# Patient Record
Sex: FEMALE | Race: OTHER | Employment: FULL TIME | URBAN - METROPOLITAN AREA
[De-identification: names, ages, dates, MRNs, and addresses within clinical notes are randomized per-mention and may not be internally consistent; named-entity substitution may affect disease eponyms.]

---

## 2017-11-22 ENCOUNTER — HOSPITAL ENCOUNTER (OUTPATIENT)
Dept: LAB | Age: 32
Discharge: HOME | End: 2017-11-22

## 2017-11-22 PROCEDURE — 84702 CHORIONIC GONADOTROPIN TEST: CPT | Performed by: NURSE PRACTITIONER

## 2017-11-22 PROCEDURE — 84443 ASSAY THYROID STIM HORMONE: CPT | Performed by: NURSE PRACTITIONER

## 2017-11-22 PROCEDURE — 80053 COMPREHEN METABOLIC PANEL: CPT | Performed by: NURSE PRACTITIONER

## 2017-11-23 LAB
ALBUMIN SERPL-MCNC: 4 G/DL (ref 3.5–5)
ALBUMIN/GLOB SERPL: 1.1 {RATIO} (ref 1.1–2.2)
ALP SERPL-CCNC: 79 U/L (ref 45–117)
ALT SERPL-CCNC: 21 U/L (ref 12–78)
ANION GAP SERPL CALC-SCNC: 7 MMOL/L (ref 5–15)
AST SERPL-CCNC: 8 U/L (ref 15–37)
BILIRUB SERPL-MCNC: 0.3 MG/DL (ref 0.2–1)
BUN SERPL-MCNC: 11 MG/DL (ref 6–20)
BUN/CREAT SERPL: 15 (ref 12–20)
CALCIUM SERPL-MCNC: 9.2 MG/DL (ref 8.5–10.1)
CHLORIDE SERPL-SCNC: 105 MMOL/L (ref 97–108)
CO2 SERPL-SCNC: 27 MMOL/L (ref 21–32)
CREAT SERPL-MCNC: 0.72 MG/DL (ref 0.55–1.02)
GLOBULIN SER CALC-MCNC: 3.8 G/DL (ref 2–4)
GLUCOSE SERPL-MCNC: 81 MG/DL (ref 65–100)
HCG SERPL-ACNC: <1 MIU/ML (ref 0–6)
POTASSIUM SERPL-SCNC: 4.1 MMOL/L (ref 3.5–5.1)
PROT SERPL-MCNC: 7.8 G/DL (ref 6.4–8.2)
SODIUM SERPL-SCNC: 139 MMOL/L (ref 136–145)
TSH SERPL DL<=0.05 MIU/L-ACNC: 1.09 UIU/ML (ref 0.36–3.74)

## 2018-08-29 ENCOUNTER — HOSPITAL ENCOUNTER (OUTPATIENT)
Dept: LAB | Age: 33
Discharge: HOME | End: 2018-08-29

## 2018-08-29 PROCEDURE — 87591 N.GONORRHOEAE DNA AMP PROB: CPT | Performed by: NURSE PRACTITIONER

## 2018-08-29 PROCEDURE — 87491 CHLMYD TRACH DNA AMP PROBE: CPT | Performed by: NURSE PRACTITIONER

## 2018-08-31 LAB
C TRACH DNA SPEC QL NAA+PROBE: NEGATIVE
N GONORRHOEA DNA SPEC QL NAA+PROBE: NEGATIVE
SAMPLE TYPE: NORMAL
SERVICE CMNT-IMP: NORMAL
SPECIMEN SOURCE: NORMAL

## 2018-09-04 ENCOUNTER — HOSPITAL ENCOUNTER (OUTPATIENT)
Dept: ULTRASOUND IMAGING | Age: 33
Discharge: HOME | End: 2018-09-04
Attending: INTERNAL MEDICINE
Payer: SUBSIDIZED

## 2018-09-04 DIAGNOSIS — Z32.00 ENCOUNTER FOR PREGNANCY TEST: ICD-10-CM

## 2018-09-04 PROCEDURE — 76856 US EXAM PELVIC COMPLETE: CPT

## 2018-09-04 PROCEDURE — 76830 TRANSVAGINAL US NON-OB: CPT

## 2019-09-16 ENCOUNTER — APPOINTMENT (OUTPATIENT)
Dept: CT IMAGING | Age: 34
End: 2019-09-16
Attending: PHYSICIAN ASSISTANT
Payer: SELF-PAY

## 2019-09-16 ENCOUNTER — HOSPITAL ENCOUNTER (EMERGENCY)
Age: 34
Discharge: HOME | End: 2019-09-16
Attending: EMERGENCY MEDICINE | Admitting: EMERGENCY MEDICINE
Payer: SELF-PAY

## 2019-09-16 VITALS
RESPIRATION RATE: 18 BRPM | TEMPERATURE: 98.2 F | SYSTOLIC BLOOD PRESSURE: 121 MMHG | HEIGHT: 65 IN | HEART RATE: 74 BPM | DIASTOLIC BLOOD PRESSURE: 75 MMHG | OXYGEN SATURATION: 100 %

## 2019-09-16 DIAGNOSIS — N39.0 URINARY TRACT INFECTION WITH HEMATURIA, SITE UNSPECIFIED: ICD-10-CM

## 2019-09-16 DIAGNOSIS — N30.01 ACUTE HEMORRHAGIC CYSTITIS: Primary | ICD-10-CM

## 2019-09-16 DIAGNOSIS — R31.9 URINARY TRACT INFECTION WITH HEMATURIA, SITE UNSPECIFIED: ICD-10-CM

## 2019-09-16 LAB
ALBUMIN SERPL-MCNC: 3.9 G/DL (ref 3.5–5)
ALBUMIN/GLOB SERPL: 0.9 {RATIO} (ref 1.1–2.2)
ALP SERPL-CCNC: 90 U/L (ref 45–117)
ALT SERPL-CCNC: 32 U/L (ref 12–78)
ANION GAP SERPL CALC-SCNC: 9 MMOL/L (ref 5–15)
APPEARANCE UR: ABNORMAL
AST SERPL-CCNC: 16 U/L (ref 15–37)
BACTERIA URNS QL MICRO: ABNORMAL /HPF
BASOPHILS # BLD: 0 K/UL (ref 0–0.1)
BASOPHILS NFR BLD: 0 % (ref 0–1)
BILIRUB SERPL-MCNC: 0.3 MG/DL (ref 0.2–1)
BILIRUB UR QL: NEGATIVE
BUN SERPL-MCNC: 14 MG/DL (ref 6–20)
BUN/CREAT SERPL: 15 (ref 12–20)
CALCIUM SERPL-MCNC: 9.4 MG/DL (ref 8.5–10.1)
CHLORIDE SERPL-SCNC: 108 MMOL/L (ref 97–108)
CO2 SERPL-SCNC: 25 MMOL/L (ref 21–32)
COLOR UR: ABNORMAL
CREAT SERPL-MCNC: 0.91 MG/DL (ref 0.55–1.02)
DIFFERENTIAL METHOD BLD: NORMAL
EOSINOPHIL # BLD: 0.1 K/UL (ref 0–0.4)
EOSINOPHIL NFR BLD: 1 % (ref 0–7)
EPITH CASTS URNS QL MICRO: ABNORMAL /LPF
ERYTHROCYTE [DISTWIDTH] IN BLOOD BY AUTOMATED COUNT: 13.9 % (ref 11.5–14.5)
GLOBULIN SER CALC-MCNC: 4.2 G/DL (ref 2–4)
GLUCOSE SERPL-MCNC: 106 MG/DL (ref 65–100)
GLUCOSE UR STRIP.AUTO-MCNC: NEGATIVE MG/DL
HCG UR QL: NEGATIVE
HCT VFR BLD AUTO: 40.9 % (ref 35–47)
HGB BLD-MCNC: 13.2 G/DL (ref 11.5–16)
HGB UR QL STRIP: ABNORMAL
IMM GRANULOCYTES # BLD AUTO: 0 K/UL (ref 0–0.04)
IMM GRANULOCYTES NFR BLD AUTO: 0 % (ref 0–0.5)
KETONES UR QL STRIP.AUTO: NEGATIVE MG/DL
LEUKOCYTE ESTERASE UR QL STRIP.AUTO: NEGATIVE
LIPASE SERPL-CCNC: 130 U/L (ref 73–393)
LYMPHOCYTES # BLD: 2.4 K/UL (ref 0.8–3.5)
LYMPHOCYTES NFR BLD: 29 % (ref 12–49)
MCH RBC QN AUTO: 29.7 PG (ref 26–34)
MCHC RBC AUTO-ENTMCNC: 32.3 G/DL (ref 30–36.5)
MCV RBC AUTO: 92.1 FL (ref 80–99)
MONOCYTES # BLD: 0.5 K/UL (ref 0–1)
MONOCYTES NFR BLD: 6 % (ref 5–13)
NEUTS SEG # BLD: 5.3 K/UL (ref 1.8–8)
NEUTS SEG NFR BLD: 64 % (ref 32–75)
NITRITE UR QL STRIP.AUTO: POSITIVE
NRBC # BLD: 0 K/UL (ref 0–0.01)
NRBC BLD-RTO: 0 PER 100 WBC
PH UR STRIP: 5 [PH] (ref 5–8)
PLATELET # BLD AUTO: 251 K/UL (ref 150–400)
PMV BLD AUTO: 10.6 FL (ref 8.9–12.9)
POTASSIUM SERPL-SCNC: 3.6 MMOL/L (ref 3.5–5.1)
PROT SERPL-MCNC: 8.1 G/DL (ref 6.4–8.2)
PROT UR STRIP-MCNC: 100 MG/DL
RBC # BLD AUTO: 4.44 M/UL (ref 3.8–5.2)
RBC #/AREA URNS HPF: >100 /HPF (ref 0–5)
SODIUM SERPL-SCNC: 142 MMOL/L (ref 136–145)
SP GR UR REFRACTOMETRY: 1.02 (ref 1–1.03)
UROBILINOGEN UR QL STRIP.AUTO: 0.2 EU/DL (ref 0.2–1)
WBC # BLD AUTO: 8.3 K/UL (ref 3.6–11)
WBC URNS QL MICRO: ABNORMAL /HPF (ref 0–4)

## 2019-09-16 PROCEDURE — 96374 THER/PROPH/DIAG INJ IV PUSH: CPT

## 2019-09-16 PROCEDURE — 80053 COMPREHEN METABOLIC PANEL: CPT

## 2019-09-16 PROCEDURE — 96375 TX/PRO/DX INJ NEW DRUG ADDON: CPT

## 2019-09-16 PROCEDURE — 36415 COLL VENOUS BLD VENIPUNCTURE: CPT

## 2019-09-16 PROCEDURE — 99283 EMERGENCY DEPT VISIT LOW MDM: CPT

## 2019-09-16 PROCEDURE — 83690 ASSAY OF LIPASE: CPT

## 2019-09-16 PROCEDURE — 74011250636 HC RX REV CODE- 250/636: Performed by: PHYSICIAN ASSISTANT

## 2019-09-16 PROCEDURE — 81025 URINE PREGNANCY TEST: CPT

## 2019-09-16 PROCEDURE — 74176 CT ABD & PELVIS W/O CONTRAST: CPT

## 2019-09-16 PROCEDURE — 81001 URINALYSIS AUTO W/SCOPE: CPT

## 2019-09-16 PROCEDURE — 85025 COMPLETE CBC W/AUTO DIFF WBC: CPT

## 2019-09-16 RX ORDER — ONDANSETRON 2 MG/ML
4 INJECTION INTRAMUSCULAR; INTRAVENOUS
Status: COMPLETED | OUTPATIENT
Start: 2019-09-16 | End: 2019-09-16

## 2019-09-16 RX ORDER — PHENAZOPYRIDINE HYDROCHLORIDE 200 MG/1
200 TABLET, FILM COATED ORAL 3 TIMES DAILY
Qty: 6 TAB | Refills: 0 | Status: SHIPPED | OUTPATIENT
Start: 2019-09-16 | End: 2019-09-18

## 2019-09-16 RX ORDER — CEFDINIR 300 MG/1
300 CAPSULE ORAL 2 TIMES DAILY
Qty: 20 CAP | Refills: 0 | Status: SHIPPED | OUTPATIENT
Start: 2019-09-16 | End: 2021-05-26 | Stop reason: SDUPTHER

## 2019-09-16 RX ORDER — KETOROLAC TROMETHAMINE 30 MG/ML
30 INJECTION, SOLUTION INTRAMUSCULAR; INTRAVENOUS
Status: COMPLETED | OUTPATIENT
Start: 2019-09-16 | End: 2019-09-16

## 2019-09-16 RX ADMIN — ONDANSETRON 4 MG: 2 INJECTION INTRAMUSCULAR; INTRAVENOUS at 15:01

## 2019-09-16 RX ADMIN — KETOROLAC TROMETHAMINE 30 MG: 30 INJECTION, SOLUTION INTRAMUSCULAR at 15:01

## 2019-09-16 NOTE — ED NOTES
Discharge instructions given to patient, all questions answered and patient verbalized understanding.   Patient ambulatory to ED lobby

## 2019-09-16 NOTE — ED PROVIDER NOTES
This is 28 yo  female presenting ambulatory to the emergency department with complaint of a 3-day history of left-sided flank pain with associated nausea, and dark-colored urine. She describes pain is constant but significantly worse today. She has taken Tylenol for pain yesterday. No medications for pain today. She reports mild constipation. No associated documented fever, chills, headache, chest pain, shortness of breath. She reports associated urinary frequency           No past medical history on file. No past surgical history on file. No family history on file.     Social History     Socioeconomic History    Marital status:      Spouse name: Not on file    Number of children: Not on file    Years of education: Not on file    Highest education level: Not on file   Occupational History    Not on file   Social Needs    Financial resource strain: Not on file    Food insecurity:     Worry: Not on file     Inability: Not on file    Transportation needs:     Medical: Not on file     Non-medical: Not on file   Tobacco Use    Smoking status: Not on file   Substance and Sexual Activity    Alcohol use: Not on file    Drug use: Not on file    Sexual activity: Not on file   Lifestyle    Physical activity:     Days per week: Not on file     Minutes per session: Not on file    Stress: Not on file   Relationships    Social connections:     Talks on phone: Not on file     Gets together: Not on file     Attends [employer]ism service: Not on file     Active member of club or organization: Not on file     Attends meetings of clubs or organizations: Not on file     Relationship status: Not on file    Intimate partner violence:     Fear of current or ex partner: Not on file     Emotionally abused: Not on file     Physically abused: Not on file     Forced sexual activity: Not on file   Other Topics Concern    Not on file   Social History Narrative    Not on file         ALLERGIES: Patient has no allergy information on record. Review of Systems   Constitutional: Negative. Negative for chills, fatigue and fever. HENT: Negative. Negative for congestion, ear pain, facial swelling, rhinorrhea, sneezing and sore throat. Respiratory: Negative for cough and shortness of breath. Cardiovascular: Negative. Negative for chest pain. Gastrointestinal: Positive for abdominal pain (left flank) and nausea. Negative for constipation, diarrhea and vomiting. Genitourinary: Positive for frequency. Negative for difficulty urinating and urgency. Neurological: Negative for dizziness, numbness and headaches. All other systems reviewed and are negative. Visit Vitals  /88 (BP 1 Location: Right arm, BP Patient Position: At rest)   Pulse (!) 112   Temp 98.7 °F (37.1 °C)   Resp 15   Ht 5' 5\" (1.651 m)   SpO2 98%            Physical Exam   Constitutional: She is oriented to person, place, and time. She appears well-developed and well-nourished. No distress. Well appearing  female in NAD   HENT:   Head: Normocephalic and atraumatic. Left Ear: External ear normal.   Mouth/Throat: No oropharyngeal exudate. Eyes: Pupils are equal, round, and reactive to light. Conjunctivae are normal.   Neck: Normal range of motion. Neck supple. Cardiovascular: Normal rate, regular rhythm and normal heart sounds. Pulmonary/Chest: Effort normal. No respiratory distress. She has no wheezes. Abdominal: Soft. Bowel sounds are normal. She exhibits no distension. There is no tenderness. There is no rebound. No CVA tenderness   Musculoskeletal: Normal range of motion. Neurological: She is alert and oriented to person, place, and time. Skin: Skin is warm and dry. No ecchymosis, no laceration and no lesion noted. Nursing note and vitals reviewed.        MDM  Number of Diagnoses or Management Options  Diagnosis management comments: 26 yo  female with complaint of left flank pain with associated nausea and dark colored urine for past three days. DDX includes obstructive uropathy vs UTI vs pyelo vs diverticulitis vs obstipation amongst others  Plan  CBC  CMP  Lipase  UA  Ct abd/pel  Analgesia  zofran  Reassess. Ainsley Lazo         Amount and/or Complexity of Data Reviewed  Clinical lab tests: ordered and reviewed  Tests in the radiology section of CPT®: reviewed and ordered  Independent visualization of images, tracings, or specimens: yes           Procedures          Progress note    Labs reviewed. Awaiting CT abd/pel. Ainsley Lazo    Pt care transferred to [facility] at end of shift. Ainsley Lazo       I was personally available for consultation in the emergency department. I have reviewed the chart and agree with the documentation recorded by the [facility], including the assessment, treatment plan, and disposition.   Sade Contreras MD

## 2019-09-16 NOTE — DISCHARGE INSTRUCTIONS
Patient Education        Wenda Marline en las mujeres: Instrucciones de cuidado - [ Urinary Tract Infection in Women: Care Instructions ]  Instrucciones de cuidado    Mike infección urinaria (UTI, por dori siglas en inglés) es un término general que hace referencia a mike infección que se produce en cualquier parte entre los riñones y la uretra (conducto por el cual se expulsa la orina). La mayoría de las UTI son infecciones de la vejiga. Con frecuencia, causan dolor o ardor al Tish Carton. Las UTI son causadas por bacterias y pueden curarse con antibióticos. Asegúrese de completar el tratamiento para que la infección desaparezca. La atención de seguimiento es mike parte clave de fang tratamiento y seguridad. Asegúrese de hacer y acudir a todas las citas, y llame a fang médico si está teniendo problemas. También es mike buena idea saber los resultados de dori exámenes y mantener mike lista de los medicamentos que tressa. ¿Cómo puede cuidarse en el hogar? · [address] No deje de tomarlos por el hecho de sentirse mejor. Debe paulino todos los antibióticos hasta terminarlos. · Jodie los próximos margaret o [address], mikki mayor cantidad de [place] y otros líquidos. [place] puede ayudar a eliminar las bacterias que provocan la infección. (Si tiene mike enfermedad de los riñones, el corazón o el hígado y tiene que Mendoza's líquidos, hable con fang médico antes de aumentar fang consumo). · Evite las bebidas gaseosas o con cafeína. Pueden irritar la vejiga. · Orine con frecuencia. Trate de vaciar la vejiga cada vez que orine. · Para aliviar el dolor, tome un baño caliente o colóquese mike almohadilla térmica a baja temperatura sobre la parte baja del abdomen o la kwame genital. Nunca se duerma mientras [place] mike almohadilla térmica. Para prevenir las infecciones urinarias  · Mikki abundante agua todos los días. [place] la ayuda a orinar con frecuencia, lo que elimina las bacterias de fang organismo.  (Si tiene [employer] enfermedad de los riñones, el corazón o el hígado y tiene que Mendoza's líquidos, hable con fang médico antes de aumentar fang consumo). · Orine cuando necesite hacerlo. · Orine inmediatamente después de oriana tenido [employer]. · Cámbiese las toallas sanitarias con frecuencia. · Evite el uso de lavados vaginales, los domingo de burbujas, los [place]ter[place] de higiene femenina y otros productos para la higiene femenina que contengan desodorantes. · Después de ir al baño, límpiese de adelante hacia atrás. ¿Cuándo debes pedir ayuda? Llama a tu médico ahora mismo o busca atención médica inmediata si:    · Aparecen síntomas kemi fiebre, escalofríos, náuseas o vómitos por primera vez, o empeoran.     · Te empieza a doler la espalda, clay debajo de la caja torácica. A esto se le llama dolor en el flanco.     · Aparece yousuf o pus en la orina.     · Tienes problemas con los antibióticos.    Presta especial atención a los cambios en tu mindy y asegúrate de comunicarte con tu médico si:    · No mejoras después de oriana tomado un antibiótico sherly 2 días.     · Los síntomas desaparecen y Trula Porsche. ¿Dónde puede encontrar más información en inglés? Yasmeen Loomis a http://may-gillian.info/. Maryfrances Carrel Y557 en la búsqueda para aprender más acerca de \"Infección urinaria en las mujeres: Instrucciones de cuidado - [ Urinary Tract Infection in Women: Care Instructions ]. \"  Revisado: 19 pattie, 2018  Versión del contenido: 12.1  © 4320-6646 [employer]wise, Incorporated. Las instrucciones de cuidado fueron adaptadas bajo licencia por [employer] (which disclaims liability or warranty for this information). Si usted tiene [place] afección médica o sobre estas instrucciones, siempre pregunte a fang profesional de mindy. [facility] niega toda garantía o responsabilidad por fang uso de esta información.

## 2019-09-27 ENCOUNTER — HOSPITAL ENCOUNTER (OUTPATIENT)
Dept: LAB | Age: 34
Discharge: HOME | End: 2019-09-27

## 2019-09-27 PROCEDURE — 87186 SC STD MICRODIL/AGAR DIL: CPT

## 2019-09-27 PROCEDURE — 87086 URINE CULTURE/COLONY COUNT: CPT

## 2019-09-27 PROCEDURE — 87077 CULTURE AEROBIC IDENTIFY: CPT

## 2019-09-29 LAB
BACTERIA SPEC CULT: ABNORMAL
CC UR VC: ABNORMAL
SERVICE CMNT-IMP: ABNORMAL

## 2020-02-25 ENCOUNTER — HOSPITAL ENCOUNTER (OUTPATIENT)
Dept: LAB | Age: 35
Discharge: HOME | End: 2020-02-25

## 2020-02-25 PROCEDURE — 80061 LIPID PANEL: CPT

## 2020-02-25 PROCEDURE — 80048 BASIC METABOLIC PNL TOTAL CA: CPT

## 2020-02-25 PROCEDURE — 85025 COMPLETE CBC W/AUTO DIFF WBC: CPT

## 2020-02-26 LAB
ANION GAP SERPL CALC-SCNC: 8 MMOL/L (ref 5–15)
BASOPHILS # BLD: 0 K/UL (ref 0–0.1)
BASOPHILS NFR BLD: 0 % (ref 0–1)
BUN SERPL-MCNC: 11 MG/DL (ref 6–20)
BUN/CREAT SERPL: 17 (ref 12–20)
CALCIUM SERPL-MCNC: 9.2 MG/DL (ref 8.5–10.1)
CHLORIDE SERPL-SCNC: 107 MMOL/L (ref 97–108)
CHOLEST SERPL-MCNC: 256 MG/DL
CO2 SERPL-SCNC: 24 MMOL/L (ref 21–32)
CREAT SERPL-MCNC: 0.66 MG/DL (ref 0.55–1.02)
DIFFERENTIAL METHOD BLD: NORMAL
EOSINOPHIL # BLD: 0.1 K/UL (ref 0–0.4)
EOSINOPHIL NFR BLD: 1 % (ref 0–7)
ERYTHROCYTE [DISTWIDTH] IN BLOOD BY AUTOMATED COUNT: 14 % (ref 11.5–14.5)
GLUCOSE SERPL-MCNC: 71 MG/DL (ref 65–100)
HCT VFR BLD AUTO: 43.6 % (ref 35–47)
HDLC SERPL-MCNC: 50 MG/DL
HDLC SERPL: 5.1 {RATIO} (ref 0–5)
HGB BLD-MCNC: 13.6 G/DL (ref 11.5–16)
IMM GRANULOCYTES # BLD AUTO: 0 K/UL (ref 0–0.04)
IMM GRANULOCYTES NFR BLD AUTO: 0 % (ref 0–0.5)
LDLC SERPL CALC-MCNC: 165.8 MG/DL (ref 0–100)
LIPID PROFILE,FLP: ABNORMAL
LYMPHOCYTES # BLD: 2.2 K/UL (ref 0.8–3.5)
LYMPHOCYTES NFR BLD: 33 % (ref 12–49)
MCH RBC QN AUTO: 29.9 PG (ref 26–34)
MCHC RBC AUTO-ENTMCNC: 31.2 G/DL (ref 30–36.5)
MCV RBC AUTO: 95.8 FL (ref 80–99)
MONOCYTES # BLD: 0.4 K/UL (ref 0–1)
MONOCYTES NFR BLD: 5 % (ref 5–13)
NEUTS SEG # BLD: 4 K/UL (ref 1.8–8)
NEUTS SEG NFR BLD: 61 % (ref 32–75)
NRBC # BLD: 0 K/UL (ref 0–0.01)
NRBC BLD-RTO: 0 PER 100 WBC
PLATELET # BLD AUTO: 224 K/UL (ref 150–400)
PMV BLD AUTO: 10.9 FL (ref 8.9–12.9)
POTASSIUM SERPL-SCNC: 4.2 MMOL/L (ref 3.5–5.1)
RBC # BLD AUTO: 4.55 M/UL (ref 3.8–5.2)
SODIUM SERPL-SCNC: 139 MMOL/L (ref 136–145)
TRIGL SERPL-MCNC: 201 MG/DL (ref ?–150)
VLDLC SERPL CALC-MCNC: 40.2 MG/DL
WBC # BLD AUTO: 6.6 K/UL (ref 3.6–11)

## 2020-03-02 ENCOUNTER — HOSPITAL ENCOUNTER (EMERGENCY)
Age: 35
Discharge: LEFT BEFORE DISCHARGE | End: 2020-03-02
Attending: STUDENT IN AN ORGANIZED HEALTH CARE EDUCATION/TRAINING PROGRAM | Admitting: STUDENT IN AN ORGANIZED HEALTH CARE EDUCATION/TRAINING PROGRAM
Payer: SELF-PAY

## 2020-03-02 VITALS
TEMPERATURE: 98.4 F | RESPIRATION RATE: 18 BRPM | OXYGEN SATURATION: 98 % | HEART RATE: 88 BPM | DIASTOLIC BLOOD PRESSURE: 93 MMHG | SYSTOLIC BLOOD PRESSURE: 144 MMHG

## 2020-03-02 PROCEDURE — 75810000275 HC EMERGENCY DEPT VISIT NO LEVEL OF CARE

## 2020-03-02 NOTE — ED TRIAGE NOTES
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  Patient is unsure when she last got a tetanus shot

## 2021-03-25 PROCEDURE — 36415 COLL VENOUS BLD VENIPUNCTURE: CPT

## 2021-03-25 PROCEDURE — 83036 HEMOGLOBIN GLYCOSYLATED A1C: CPT

## 2021-03-25 PROCEDURE — 80061 LIPID PANEL: CPT

## 2021-03-26 ENCOUNTER — HOSPITAL ENCOUNTER (OUTPATIENT)
Dept: LAB | Age: 36
Discharge: HOME | End: 2021-03-26

## 2021-03-26 LAB
CHOLEST SERPL-MCNC: 223 MG/DL
EST. AVERAGE GLUCOSE BLD GHB EST-MCNC: 105 MG/DL
HBA1C MFR BLD: 5.3 % (ref 4–5.6)
HDLC SERPL-MCNC: 48 MG/DL
HDLC SERPL: 4.6 {RATIO} (ref 0–5)
LDLC SERPL CALC-MCNC: 139.6 MG/DL (ref 0–100)
LIPID PROFILE,FLP: ABNORMAL
TRIGL SERPL-MCNC: 177 MG/DL (ref ?–150)
VLDLC SERPL CALC-MCNC: 35.4 MG/DL

## 2021-04-19 ENCOUNTER — HOSPITAL ENCOUNTER (EMERGENCY)
Dept: ULTRASOUND IMAGING | Age: 36
Discharge: HOME | End: 2021-04-19
Attending: NURSE PRACTITIONER

## 2021-04-19 ENCOUNTER — HOSPITAL ENCOUNTER (EMERGENCY)
Age: 36
Discharge: HOME | End: 2021-04-20
Attending: EMERGENCY MEDICINE

## 2021-04-19 DIAGNOSIS — Z34.90 PREGNANCY AT EARLY STAGE: Primary | ICD-10-CM

## 2021-04-19 LAB
APPEARANCE UR: NORMAL
BILIRUB UR QL: NEGATIVE
COLOR UR: YELLOW
GLUCOSE UR STRIP.AUTO-MCNC: NEGATIVE MG/DL
HCG SERPL-ACNC: 843 MIU/ML (ref 0–10)
HCG UR QL: POSITIVE
HGB UR QL STRIP: NEGATIVE
KETONES UR QL STRIP.AUTO: NEGATIVE MG/DL
LEUKOCYTE ESTERASE UR QL STRIP.AUTO: NEGATIVE
NITRITE UR QL STRIP.AUTO: NEGATIVE
PH UR STRIP: 5.5 [PH] (ref 5–8)
PROT UR STRIP-MCNC: NEGATIVE MG/DL
SERVICE CMNT-IMP: NORMAL
SP GR UR REFRACTOMETRY: 1.02 (ref 1–1.03)
UROBILINOGEN UR QL STRIP.AUTO: 0.2 EU/DL (ref 0.2–1)
WET PREP GENITAL: NORMAL

## 2021-04-19 PROCEDURE — 81003 URINALYSIS AUTO W/O SCOPE: CPT

## 2021-04-19 PROCEDURE — 99284 EMERGENCY DEPT VISIT MOD MDM: CPT

## 2021-04-19 PROCEDURE — 84702 CHORIONIC GONADOTROPIN TEST: CPT

## 2021-04-19 PROCEDURE — 87210 SMEAR WET MOUNT SALINE/INK: CPT

## 2021-04-19 PROCEDURE — 87491 CHLMYD TRACH DNA AMP PROBE: CPT

## 2021-04-19 PROCEDURE — 87086 URINE CULTURE/COLONY COUNT: CPT

## 2021-04-19 PROCEDURE — 81025 URINE PREGNANCY TEST: CPT

## 2021-04-19 PROCEDURE — 93975 VASCULAR STUDY: CPT

## 2021-04-19 RX ORDER — B-COMPLEX WITH VITAMIN C
1 TABLET ORAL DAILY
Qty: 30 TAB | Refills: 1 | Status: SHIPPED | OUTPATIENT
Start: 2021-04-19 | End: 2021-05-26 | Stop reason: ALTCHOICE

## 2021-04-20 VITALS
HEIGHT: 63 IN | TEMPERATURE: 98.4 F | BODY MASS INDEX: 35.44 KG/M2 | HEART RATE: 94 BPM | OXYGEN SATURATION: 100 % | DIASTOLIC BLOOD PRESSURE: 61 MMHG | WEIGHT: 200 LBS | RESPIRATION RATE: 20 BRPM | SYSTOLIC BLOOD PRESSURE: 119 MMHG

## 2021-04-20 NOTE — ED PROVIDER NOTES
EMERGENCY DEPARTMENT HISTORY AND PHYSICAL EXAM    8:38 PM      Date: 4/19/2021  Patient Name: Arlin Dias    History of Presenting Illness     Chief Complaint   Patient presents with    Abdominal Pain         History Provided By: Patient    Additional History (Context): Arlin Dias is a 38 y.o. female with no significant past medical history who presents with pelvic pain for 3 weeks and positive pregnancy test.  Patient took a positive pregnancy test 5 days ago at home. Last normal menstrual cycle was February 10, 2021. She denies any vaginal bleeding or vaginal discharge. She denies any dysuria, nausea, vomiting, diarrhea, constipation, fever, or chills. G5, P3, A1. PCP: None    Current Outpatient Medications   Medication Sig Dispense Refill    prenatal vit no.130-iron-folic (Prenatal Vitamin) 27 mg iron- 800 mcg tab tablet Take 1 Tab by mouth daily. 30 Tab 1    cefdinir (OMNICEF) 300 mg capsule Take 1 Cap by mouth two (2) times a day. 20 Cap 0       Past History     Past Medical History:  History reviewed. No pertinent past medical history. Past Surgical History:  History reviewed. No pertinent surgical history. Family History:  History reviewed. No pertinent family history. Social History:  Social History     Tobacco Use    Smoking status: Never Smoker    Smokeless tobacco: Never Used   Substance Use Topics    Alcohol use: Not Currently    Drug use: Never       Allergies:  No Known Allergies      Review of Systems       Review of Systems   Constitutional: Negative. Negative for chills and fever. HENT: Negative. Negative for congestion, ear pain and rhinorrhea. Eyes: Negative. Negative for pain and redness. Respiratory: Negative. Negative for cough and shortness of breath. Cardiovascular: Negative. Negative for chest pain, palpitations and leg swelling. Gastrointestinal: Negative.   Negative for abdominal pain, constipation, diarrhea, nausea and vomiting. Genitourinary: Positive for pelvic pain. Negative for dysuria, frequency, hematuria and urgency. Musculoskeletal: Negative. Negative for back pain, gait problem, joint swelling and neck pain. Skin: Negative. Negative for rash and wound. Neurological: Negative. Negative for dizziness, seizures, speech difficulty, weakness, light-headedness and headaches. Hematological: Negative for adenopathy. Does not bruise/bleed easily. All other systems reviewed and are negative. Physical Exam     Visit Vitals  BP (!) 155/92 (BP 1 Location: Left upper arm, BP Patient Position: At rest)   Pulse 94   Temp 98.4 °F (36.9 °C)   Resp 20   Ht 5' 2.99\" (1.6 m)   Wt 90.7 kg (200 lb)   SpO2 100%   BMI 35.44 kg/m²         Physical Exam  Vitals signs and nursing note reviewed. Constitutional:       General: She is not in acute distress. Appearance: Normal appearance. She is not ill-appearing, toxic-appearing or diaphoretic. HENT:      Head: Normocephalic and atraumatic. Nose: Nose normal.   Eyes:      General: Lids are normal. Vision grossly intact. Conjunctiva/sclera: Conjunctivae normal.      Pupils: Pupils are equal, round, and reactive to light. Neck:      Musculoskeletal: Full passive range of motion without pain and normal range of motion. Cardiovascular:      Rate and Rhythm: Normal rate and regular rhythm. Pulmonary:      Effort: Pulmonary effort is normal.      Breath sounds: Normal breath sounds. Musculoskeletal: Normal range of motion. Skin:     General: Skin is warm and dry. Capillary Refill: Capillary refill takes less than 2 seconds. Neurological:      General: No focal deficit present. Mental Status: She is alert and oriented to person, place, and time. Psychiatric:         Mood and Affect: Mood normal.         Behavior: Behavior normal. Behavior is cooperative.        Pelvic Exam    Date/Time: 4/19/2021 8:48 PM  Performed by: NP  Procedure duration:  10 minutes. Exam assisted by:  Latrell Coto. Type of exam performed: bimanual and speculum. External genitalia appearance: normal.    Vaginal exam:  discharge. The amount of discharge was:  mild. The discharge was thin, watery and clear. Cervical exam:  normal, os closed and no cervical motion tenderness. Specimen(s) collected:  chlamydia, GC and vaginal culture. Bimanual exam:  uterine tenderness. Patient tolerance: patient tolerated the procedure well with no immediate complications            Diagnostic Study Results     Labs -  Recent Results (from the past 12 hour(s))   URINALYSIS W/ RFLX MICROSCOPIC    Collection Time: 04/19/21  8:40 PM   Result Value Ref Range    Color YELLOW      Appearance CLOUDY      Specific gravity 1.022 1.005 - 1.030      pH (UA) 5.5 5.0 - 8.0      Protein Negative NEG mg/dL    Glucose Negative NEG mg/dL    Ketone Negative NEG mg/dL    Bilirubin Negative NEG      Blood Negative NEG      Urobilinogen 0.2 0.2 - 1.0 EU/dL    Nitrites Negative NEG      Leukocyte Esterase Negative NEG     WET PREP    Collection Time: 04/19/21  8:45 PM    Specimen: Vagina   Result Value Ref Range    Special Requests: NO SPECIAL REQUESTS      Wet prep NO YEAST,TRICHOMONAS OR CLUE CELLS NOTED     HCG URINE, QL. - POC    Collection Time: 04/19/21  8:45 PM   Result Value Ref Range    Pregnancy test,urine (POC) Positive (A) NEG         Radiologic Studies -   US OB < 14 WKS W DOPPLER    (Results Pending)         Medical Decision Making   I am the first provider for this patient. I reviewed available nursing notes, past medical history, past surgical history, family history and social history. Vital Signs-Reviewed the patient's vital signs. Records Reviewed: Nursing Notes and Old Medical Records (Time of Review: 8:38 PM)    Pulse Oximetry Analysis - 100% on RA-normal     ED Course: Progress Notes, Reevaluation, and Consults:  8:38 PM  Initial assessment performed.  The patients presenting problems have been discussed, and they/their family are in agreement with the care plan formulated and outlined with them. I have encouraged them to ask questions as they arise throughout their visit. 9:16 PM Wet prep negative for clue cells, yeast, or BV. Urinalysis unremarkable. hCG is positive for pregnancy. Ultrasound ordered. 10:27 PM : Pt care transferred to Dr. Mary Delgado, Taya Zamorano DO, ED provider. History of patient complaint(s), available diagnostic reports and current treatment plan has been discussed thoroughly. Bedside rounding on patient occured : no  Intended disposition of patient : Pending  Pending diagnostics reports and/or labs (please list): fetal US    Provider Notes (Medical Decision Making):     Patient is a 25-year-old female who presents to the ER with complaints of pelvic pain for 3 weeks with a positive home pregnancy test 5 days ago. She denies any discharge, urinary complaints, has no abdominal pain, no nausea, vomiting, diarrhea, and no vaginal bleeding or discharge. Will obtain appropriate studies to evaluate patient's complaints and treat symptomatically. Will disposition after reassessment assuming no clinical change or worsening and appropriate response to symptomatic treatment. 11:00 PM  Patient's presentation, labs/imaging and plan of care was reviewed with Dimitry Estrella DO as part of sign out. They will review US as part of the plan discussed with the patient. 12:11 AM  Patient's ultrasound showing small gestational sac with no yolk sac or fetal pole. This corresponds to early pregnancy along with her beta-hCG of 800. There is no evidence of complication. Patient urged to follow-up with OB/GYN for routine prenatal care. Diagnosis     Clinical Impression:   1.  Pregnancy at early stage        Disposition: Pending         Follow-up Information     Follow up With Specialties Details Why Contact Iam Gurrola MD Obstetrics & Gynecology Schedule an appointment as soon as possible for a visit  Follow-up from the Emergency Department [address] [id number] Counts include 234 beds at the [facility]      [facility] EMERGENCY DEPT Emergency Medicine  As needed, If symptoms worsen [address] [id number]  [phone]           Current Discharge Medication List      START taking these medications    Details   prenatal vit no.130-iron-folic (Prenatal Vitamin) 27 mg iron- 800 mcg tab tablet Take 1 Tab by mouth daily. Qty: 30 Tab, Refills: 1               Dictation disclaimer:  Please note that this dictation was completed with [employer], the [employer] voice recognition software. Quite often unanticipated grammatical, syntax, homophones, and other interpretive errors are inadvertently transcribed by the computer software. Please disregard these errors. Please excuse any errors that have escaped final proofreading.

## 2021-04-20 NOTE — ED NOTES
I have reviewed discharge instructions with the patient. The patient verbalized understanding. Discharge medications reviewed with patient and appropriate educational materials and side effects teaching were provided. NAD. VSS. Easy WOB. AOX4. Ambulatory with steady gait.

## 2021-04-20 NOTE — ED TRIAGE NOTES
Patient with + pregnancy test on Wednesday c/o lower abdominal pain x3 weeks. Reports pain is worse today. LMP 02/10/2021.

## 2021-04-20 NOTE — ED NOTES
Patient resting comfortably. NAD. VSS. Easy WOB. Bed low and locked. Call bell within reach. AOX4.  Awaiting US

## 2021-04-21 LAB
BACTERIA SPEC CULT: NORMAL
C TRACH RRNA SPEC QL NAA+PROBE: NEGATIVE
CC UR VC: NORMAL
N GONORRHOEA RRNA SPEC QL NAA+PROBE: NEGATIVE
PLEASE NOTE:, 188601: NORMAL
SERVICE CMNT-IMP: NORMAL
SPECIMEN SOURCE: NORMAL

## 2021-05-26 ENCOUNTER — APPOINTMENT (OUTPATIENT)
Dept: CT IMAGING | Age: 36
End: 2021-05-26
Attending: PHYSICIAN ASSISTANT

## 2021-05-26 ENCOUNTER — HOSPITAL ENCOUNTER (EMERGENCY)
Age: 36
Discharge: HOME | End: 2021-05-26
Attending: EMERGENCY MEDICINE

## 2021-05-26 VITALS
HEART RATE: 96 BPM | OXYGEN SATURATION: 97 % | TEMPERATURE: 98 F | SYSTOLIC BLOOD PRESSURE: 116 MMHG | HEIGHT: 64 IN | DIASTOLIC BLOOD PRESSURE: 63 MMHG | RESPIRATION RATE: 16 BRPM | BODY MASS INDEX: 38.93 KG/M2 | WEIGHT: 228 LBS

## 2021-05-26 DIAGNOSIS — N10 ACUTE PYELONEPHRITIS: Primary | ICD-10-CM

## 2021-05-26 DIAGNOSIS — Z87.59 HISTORY OF MISCARRIAGE: ICD-10-CM

## 2021-05-26 LAB
ABO + RH BLD: NORMAL
ALBUMIN SERPL-MCNC: 3.6 G/DL (ref 3.4–5)
ALBUMIN/GLOB SERPL: 0.9 {RATIO} (ref 0.8–1.7)
ALP SERPL-CCNC: 84 U/L (ref 45–117)
ALT SERPL-CCNC: 33 U/L (ref 13–56)
ANION GAP SERPL CALC-SCNC: 3 MMOL/L (ref 3–18)
APPEARANCE UR: ABNORMAL
AST SERPL-CCNC: 11 U/L (ref 10–38)
BACTERIA URNS QL MICRO: ABNORMAL /HPF
BASOPHILS # BLD: 0 K/UL (ref 0–0.1)
BASOPHILS NFR BLD: 0 % (ref 0–2)
BILIRUB SERPL-MCNC: 0.3 MG/DL (ref 0.2–1)
BILIRUB UR QL: NEGATIVE
BUN SERPL-MCNC: 10 MG/DL (ref 7–18)
BUN/CREAT SERPL: 13 (ref 12–20)
CALCIUM SERPL-MCNC: 8.8 MG/DL (ref 8.5–10.1)
CHLORIDE SERPL-SCNC: 107 MMOL/L (ref 100–111)
CO2 SERPL-SCNC: 29 MMOL/L (ref 21–32)
COLOR UR: YELLOW
CREAT SERPL-MCNC: 0.75 MG/DL (ref 0.6–1.3)
DIFFERENTIAL METHOD BLD: NORMAL
EOSINOPHIL # BLD: 0.1 K/UL (ref 0–0.4)
EOSINOPHIL NFR BLD: 1 % (ref 0–5)
EPITH CASTS URNS QL MICRO: ABNORMAL /LPF (ref 0–5)
ERYTHROCYTE [DISTWIDTH] IN BLOOD BY AUTOMATED COUNT: 13.9 % (ref 11.6–14.5)
GLOBULIN SER CALC-MCNC: 3.8 G/DL (ref 2–4)
GLUCOSE SERPL-MCNC: 111 MG/DL (ref 74–99)
GLUCOSE UR STRIP.AUTO-MCNC: NEGATIVE MG/DL
HCG SERPL-ACNC: <1 MIU/ML (ref 0–10)
HCT VFR BLD AUTO: 41.1 % (ref 35–45)
HGB BLD-MCNC: 13.4 G/DL (ref 12–16)
HGB UR QL STRIP: ABNORMAL
KETONES UR QL STRIP.AUTO: NEGATIVE MG/DL
LEUKOCYTE ESTERASE UR QL STRIP.AUTO: ABNORMAL
LIPASE SERPL-CCNC: 98 U/L (ref 73–393)
LYMPHOCYTES # BLD: 2.6 K/UL (ref 0.9–3.6)
LYMPHOCYTES NFR BLD: 26 % (ref 21–52)
MCH RBC QN AUTO: 30.5 PG (ref 24–34)
MCHC RBC AUTO-ENTMCNC: 32.6 G/DL (ref 31–37)
MCV RBC AUTO: 93.6 FL (ref 74–97)
MONOCYTES # BLD: 0.6 K/UL (ref 0.05–1.2)
MONOCYTES NFR BLD: 6 % (ref 3–10)
NEUTS SEG # BLD: 6.8 K/UL (ref 1.8–8)
NEUTS SEG NFR BLD: 67 % (ref 40–73)
NITRITE UR QL STRIP.AUTO: NEGATIVE
PH UR STRIP: 6 [PH] (ref 5–8)
PLATELET # BLD AUTO: 257 K/UL (ref 135–420)
PMV BLD AUTO: 10.3 FL (ref 9.2–11.8)
POTASSIUM SERPL-SCNC: 3.7 MMOL/L (ref 3.5–5.5)
PROT SERPL-MCNC: 7.4 G/DL (ref 6.4–8.2)
PROT UR STRIP-MCNC: 30 MG/DL
RBC # BLD AUTO: 4.39 M/UL (ref 4.2–5.3)
RBC #/AREA URNS HPF: ABNORMAL /HPF (ref 0–5)
SODIUM SERPL-SCNC: 139 MMOL/L (ref 136–145)
SP GR UR REFRACTOMETRY: 1.02 (ref 1–1.03)
UROBILINOGEN UR QL STRIP.AUTO: 0.2 EU/DL (ref 0.2–1)
WBC # BLD AUTO: 10.1 K/UL (ref 4.6–13.2)
WBC URNS QL MICRO: ABNORMAL /HPF (ref 0–5)

## 2021-05-26 PROCEDURE — 74011250636 HC RX REV CODE- 250/636: Performed by: PHYSICIAN ASSISTANT

## 2021-05-26 PROCEDURE — 86900 BLOOD TYPING SEROLOGIC ABO: CPT

## 2021-05-26 PROCEDURE — 74011000250 HC RX REV CODE- 250: Performed by: PHYSICIAN ASSISTANT

## 2021-05-26 PROCEDURE — 83690 ASSAY OF LIPASE: CPT

## 2021-05-26 PROCEDURE — 87086 URINE CULTURE/COLONY COUNT: CPT

## 2021-05-26 PROCEDURE — 74176 CT ABD & PELVIS W/O CONTRAST: CPT

## 2021-05-26 PROCEDURE — 96374 THER/PROPH/DIAG INJ IV PUSH: CPT

## 2021-05-26 PROCEDURE — 99282 EMERGENCY DEPT VISIT SF MDM: CPT

## 2021-05-26 PROCEDURE — 81001 URINALYSIS AUTO W/SCOPE: CPT

## 2021-05-26 PROCEDURE — 80053 COMPREHEN METABOLIC PANEL: CPT

## 2021-05-26 PROCEDURE — 85025 COMPLETE CBC W/AUTO DIFF WBC: CPT

## 2021-05-26 PROCEDURE — 84702 CHORIONIC GONADOTROPIN TEST: CPT

## 2021-05-26 RX ORDER — ONDANSETRON 4 MG/1
4 TABLET, ORALLY DISINTEGRATING ORAL
Qty: 20 TABLET | Refills: 0 | Status: SHIPPED | OUTPATIENT
Start: 2021-05-26 | End: 2021-09-09

## 2021-05-26 RX ORDER — LEVOFLOXACIN 750 MG/1
750 TABLET ORAL DAILY
Qty: 7 TABLET | Refills: 0 | Status: SHIPPED | OUTPATIENT
Start: 2021-05-26 | End: 2021-06-02

## 2021-05-26 RX ADMIN — CEFTRIAXONE 1 G: 1 INJECTION, POWDER, FOR SOLUTION INTRAMUSCULAR; INTRAVENOUS at 14:07

## 2021-05-26 RX ADMIN — SODIUM CHLORIDE, SODIUM LACTATE, POTASSIUM CHLORIDE, AND CALCIUM CHLORIDE 1000 ML: 600; 310; 30; 20 INJECTION, SOLUTION INTRAVENOUS at 14:13

## 2021-05-26 NOTE — ED PROVIDER NOTES
EMERGENCY DEPARTMENT HISTORY AND PHYSICAL EXAM    Date: 5/26/2021  Patient Name: Pete Juarez    History of Presenting Illness     Chief Complaint   Patient presents with    Abdominal Pain     Lao interpretor module used as pt is non-english speaker    History Provided By: Patient    Chief Complaint: flank pain, dysuria    HPI(Context):   12:52 PM  Pete Juarez is a 39 y.o. female with PMHX of miscarriage, ESBL UTI, who presents to the emergency department C/O right flank pain. Associated sxs include dysuria, hematuria and urinary frequency. Sxs x 1 day. Pain is sharp and constant. Pt reports spontaneous miscarriage one month ago and she is no longer pregnancy. Pt denies fever, nausea, vomiting, hx of renal stones, vaginal bleeding, CP, SOB, and any other sxs or complaints. PCP: None    Current Outpatient Medications   Medication Sig Dispense Refill    levoFLOXacin (Levaquin) 750 mg tablet Take 1 Tablet by mouth daily for 7 days. Indications: urinary tract infection due to E. coli bacteria, pyelonephritis, infection and inflammation of the kidney and renal pelvis 7 Tablet 0    ondansetron (Zofran ODT) 4 mg disintegrating tablet Take 1 Tablet by mouth every eight (8) hours as needed for Nausea (or vomiting.). Allow to dissolve on tongue 20 Tablet 0       Past History     Past Medical History:  History reviewed. No pertinent past medical history. Past Surgical History:  History reviewed. No pertinent surgical history. Family History:  History reviewed. No pertinent family history. Social History:  Social History     Tobacco Use    Smoking status: Never Smoker    Smokeless tobacco: Never Used   Substance Use Topics    Alcohol use: Not Currently    Drug use: Never       Allergies:  No Known Allergies      Review of Systems   Review of Systems   Constitutional: Negative for chills and fever. Gastrointestinal: Negative for nausea and vomiting.    Genitourinary: Positive for dysuria, flank pain, hematuria and urgency. Negative for vaginal bleeding. Musculoskeletal: Positive for back pain. All other systems reviewed and are negative. Physical Exam     Vitals:    05/26/21 1245   BP: 116/63   Pulse: 96   Resp: 16   Temp: 98 °F (36.7 °C)   SpO2: 97%   Weight: 103.4 kg (228 lb)   Height: 5' 4\" (1.626 m)     Physical Exam  Vitals and nursing note reviewed. Constitutional:       General: She is not in acute distress. Appearance: She is well-developed. She is not diaphoretic. HENT:      Head: Normocephalic and atraumatic. Right Ear: External ear normal.      Left Ear: External ear normal.      Nose: Nose normal.   Eyes:      General: No scleral icterus. Right eye: No discharge. Left eye: No discharge. Conjunctiva/sclera: Conjunctivae normal.   Cardiovascular:      Rate and Rhythm: Normal rate and regular rhythm. Heart sounds: Normal heart sounds. No murmur heard. No friction rub. No gallop. Pulmonary:      Effort: Pulmonary effort is normal. No tachypnea, accessory muscle usage or respiratory distress. Breath sounds: Normal breath sounds. No decreased breath sounds, wheezing, rhonchi or rales. Abdominal:      Palpations: Abdomen is soft. Tenderness: There is no abdominal tenderness. There is right CVA tenderness. There is no left CVA tenderness, guarding or rebound. Negative signs include McBurney's sign. Musculoskeletal:         General: Normal range of motion. Cervical back: Normal range of motion. Skin:     General: Skin is warm and dry. Neurological:      Mental Status: She is alert and oriented to person, place, and time.    Psychiatric:         Behavior: Behavior normal.         Judgment: Judgment normal.             Diagnostic Study Results     Labs -     Recent Results (from the past 12 hour(s))   URINALYSIS W/ RFLX MICROSCOPIC    Collection Time: 05/26/21 12:50 PM   Result Value Ref Range    Color YELLOW Appearance CLOUDY      Specific gravity 1.024 1.005 - 1.030      pH (UA) 6.0 5.0 - 8.0      Protein 30 (A) NEG mg/dL    Glucose Negative NEG mg/dL    Ketone Negative NEG mg/dL    Bilirubin Negative NEG      Blood SMALL (A) NEG      Urobilinogen 0.2 0.2 - 1.0 EU/dL    Nitrites Negative NEG      Leukocyte Esterase LARGE (A) NEG     URINE MICROSCOPIC ONLY    Collection Time: 05/26/21 12:50 PM   Result Value Ref Range    WBC TOO NUMEROUS TO COUNT 0 - 5 /hpf    RBC 0 to 3 0 - 5 /hpf    Epithelial cells 3+ 0 - 5 /lpf    Bacteria 2+ (A) NEG /hpf   CBC WITH AUTOMATED DIFF    Collection Time: 05/26/21  2:15 PM   Result Value Ref Range    WBC 10.1 4.6 - 13.2 K/uL    RBC 4.39 4.20 - 5.30 M/uL    HGB 13.4 12.0 - 16.0 g/dL    HCT 41.1 35.0 - 45.0 %    MCV 93.6 74.0 - 97.0 FL    MCH 30.5 24.0 - 34.0 PG    MCHC 32.6 31.0 - 37.0 g/dL    RDW 13.9 11.6 - 14.5 %    PLATELET 292 290 - 215 K/uL    MPV 10.3 9.2 - 11.8 FL    NEUTROPHILS 67 40 - 73 %    LYMPHOCYTES 26 21 - 52 %    MONOCYTES 6 3 - 10 %    EOSINOPHILS 1 0 - 5 %    BASOPHILS 0 0 - 2 %    ABS. NEUTROPHILS 6.8 1.8 - 8.0 K/UL    ABS. LYMPHOCYTES 2.6 0.9 - 3.6 K/UL    ABS. MONOCYTES 0.6 0.05 - 1.2 K/UL    ABS. EOSINOPHILS 0.1 0.0 - 0.4 K/UL    ABS. BASOPHILS 0.0 0.0 - 0.1 K/UL    DF AUTOMATED     METABOLIC PANEL, COMPREHENSIVE    Collection Time: 05/26/21  2:15 PM   Result Value Ref Range    Sodium 139 136 - 145 mmol/L    Potassium 3.7 3.5 - 5.5 mmol/L    Chloride 107 100 - 111 mmol/L    CO2 29 21 - 32 mmol/L    Anion gap 3 3.0 - 18 mmol/L    Glucose 111 (H) 74 - 99 mg/dL    BUN 10 7.0 - 18 MG/DL    Creatinine 0.75 0.6 - 1.3 MG/DL    BUN/Creatinine ratio 13 12 - 20      GFR est AA >60 >60 ml/min/1.73m2    GFR est non-AA >60 >60 ml/min/1.73m2    Calcium 8.8 8.5 - 10.1 MG/DL    Bilirubin, total 0.3 0.2 - 1.0 MG/DL    ALT (SGPT) 33 13 - 56 U/L    AST (SGOT) 11 10 - 38 U/L    Alk.  phosphatase 84 45 - 117 U/L    Protein, total 7.4 6.4 - 8.2 g/dL    Albumin 3.6 3.4 - 5.0 g/dL Globulin 3.8 2.0 - 4.0 g/dL    A-G Ratio 0.9 0.8 - 1.7     LIPASE    Collection Time: 05/26/21  2:15 PM   Result Value Ref Range    Lipase 98 73 - 393 U/L   BLOOD TYPE, (ABO+RH)    Collection Time: 05/26/21  2:15 PM   Result Value Ref Range    ABO/Rh(D) A POSITIVE    BETA HCG, QT    Collection Time: 05/26/21  2:15 PM   Result Value Ref Range    Beta HCG, QT <1 0 - 10 MIU/ML       CT ABD PELV WO CONT   Final Result      Bilateral periureteral stranding and urothelial thickening with mild   hydroureteronephrosis, concerning for underlying infection. No obstructing   calculus. Correlate with urinalysis. CT Results  (Last 48 hours)               05/26/21 1545  CT ABD PELV WO CONT Final result    Impression:      Bilateral periureteral stranding and urothelial thickening with mild   hydroureteronephrosis, concerning for underlying infection. No obstructing   calculus. Correlate with urinalysis. Narrative:  EXAM: CT of the abdomen and pelvis       INDICATION: Pain. COMPARISON: 9/16/2019. TECHNIQUE: Axial CT imaging of the abdomen and pelvis was performed without   intravenous contrast. Multiplanar reformats were generated. One or more dose reduction techniques were used on this CT: automated exposure   control, adjustment of the mAs and/or kVp according to patient size, and   iterative reconstruction techniques. The specific techniques used on this CT   exam have been documented in the patient's electronic medical record. Digital   Imaging and Communications in Medicine (DICOM) format image data are available   to nonaffiliated external healthcare facilities or entities on a secure, media   free, reciprocally searchable basis with patient authorization for at least a   12-month period after this study. _______________       FINDINGS:       LOWER CHEST: Unremarkable. LIVER, BILIARY: Hepatic steatosis. No biliary dilation. Gallbladder is   unremarkable.        PANCREAS: Normal. SPLEEN: Normal.       ADRENALS: Normal.       KIDNEYS/URETERS/BLADDER: Bilateral periureteral stranding and urothelial   thickening with mild hydroureteronephrosis. No obstructing calculus. PELVIC ORGANS: Unremarkable. VASCULATURE: Unremarkable       LYMPH NODES: No enlarged lymph nodes. GASTROINTESTINAL TRACT: No bowel dilation or wall thickening. BONES: No acute or aggressive osseous abnormalities identified. OTHER: None.       _______________               CXR Results  (Last 48 hours)    None          Medications given in the ED-  Medications   lactated ringers bolus infusion 1,000 mL (1,000 mL IntraVENous New Bag 5/26/21 1413)   cefTRIAXone (ROCEPHIN) 1 g in sterile water (preservative free) 10 mL IV syringe (1 g IntraVENous New Bag 5/26/21 1407)         Medical Decision Making   I am the first provider for this patient. I reviewed the vital signs, available nursing notes, past medical history, past surgical history, family history and social history. Vital Signs-Reviewed the patient's vital signs. Pulse Oximetry Analysis - 97% on RA. NORMAL    Records Reviewed: Nursing Notes, Old Medical Records, Previous Radiology Studies and Previous Laboratory Studies    Provider Notes (Medical Decision Making): UTI/pyelo, stone, appy, gastroenteritis, pancreatitis, hepatitis. Doubt dissection    Procedures:  Procedures    ED Course:   12:52 PM Initial assessment performed. The patients presenting problems have been discussed, and they are in agreement with the care plan formulated and outlined with them. I have encouraged them to ask questions as they arise throughout their visit. Diagnosis and Disposition       Imaging without obstructive stone. Will tx for acute pyelo with hx of ESBL. Previous urine C&S reveals sensitivity to Levaquin. Will start on ABX and await urine culture. Labs unremarkable including negative HCG with recent spontaneous miscarriage.  No vaginal bleeding complaints. Reasons to RTED discussed with pt. All questions answered. Pt feels comfortable going home at this time. Pt expressed understanding and she agrees with plan. All findings and tx plan discussed with pt using Ghanaian interpretor. 1. Acute pyelonephritis    2. History of miscarriage        PLAN:  1. D/C Home  2. Current Discharge Medication List      START taking these medications    Details   levoFLOXacin (Levaquin) 750 mg tablet Take 1 Tablet by mouth daily for 7 days. Indications: urinary tract infection due to E. coli bacteria, pyelonephritis, infection and inflammation of the kidney and renal pelvis  Qty: 7 Tablet, Refills: 0  Start date: 5/26/2021, End date: 6/2/2021      ondansetron (Zofran ODT) 4 mg disintegrating tablet Take 1 Tablet by mouth every eight (8) hours as needed for Nausea (or vomiting.). Allow to dissolve on tongue  Qty: 20 Tablet, Refills: 0  Start date: 5/26/2021           3. Follow-up Information     Follow up With Specialties Details Why Tiffanie Mccullough MD Urology   [address] [address]  [phone]      [facility] EMERGENCY DEPT Emergency Medicine   [address]  [phone]        _______________________________    Attestations: This note is prepared by Isaac Maldonado PA-C.  _______________________________          Please note that this dictation was completed with [employer], the [employer] voice recognition software. Quite often unanticipated grammatical, syntax, homophones, and other interpretive errors are inadvertently transcribed by the computer software. Please disregard these errors. Please excuse any errors that have escaped final proofreading.

## 2021-05-26 NOTE — LETTER
Flaco Lagunas was seen and treated in our emergency department on 5/26/2021. She may return to work on 5/29/21. If you have any questions or concerns, please don't hesitate to call. RAMAN Collins for RENO Flynn

## 2021-05-28 LAB
BACTERIA SPEC CULT: NORMAL
CC UR VC: NORMAL
SERVICE CMNT-IMP: NORMAL

## 2021-07-29 ENCOUNTER — APPOINTMENT (OUTPATIENT)
Dept: GENERAL RADIOLOGY | Age: 36
End: 2021-07-29
Attending: PHYSICIAN ASSISTANT

## 2021-07-29 ENCOUNTER — APPOINTMENT (OUTPATIENT)
Dept: ULTRASOUND IMAGING | Age: 36
End: 2021-07-29
Attending: PHYSICIAN ASSISTANT

## 2021-07-29 ENCOUNTER — APPOINTMENT (OUTPATIENT)
Dept: VASCULAR SURGERY | Age: 36
End: 2021-07-29
Attending: PHYSICIAN ASSISTANT

## 2021-07-29 ENCOUNTER — HOSPITAL ENCOUNTER (EMERGENCY)
Age: 36
Discharge: HOME | End: 2021-07-29
Attending: STUDENT IN AN ORGANIZED HEALTH CARE EDUCATION/TRAINING PROGRAM

## 2021-07-29 VITALS
HEIGHT: 63 IN | TEMPERATURE: 99.2 F | DIASTOLIC BLOOD PRESSURE: 66 MMHG | SYSTOLIC BLOOD PRESSURE: 123 MMHG | RESPIRATION RATE: 23 BRPM | WEIGHT: 200 LBS | BODY MASS INDEX: 35.44 KG/M2 | HEART RATE: 93 BPM | OXYGEN SATURATION: 100 %

## 2021-07-29 DIAGNOSIS — R10.9 ABDOMINAL PAIN DURING PREGNANCY IN FIRST TRIMESTER: ICD-10-CM

## 2021-07-29 DIAGNOSIS — O26.891 ABDOMINAL PAIN DURING PREGNANCY IN FIRST TRIMESTER: ICD-10-CM

## 2021-07-29 DIAGNOSIS — R00.2 PALPITATIONS: ICD-10-CM

## 2021-07-29 DIAGNOSIS — O41.8X10 SUBCHORIONIC HEMORRHAGE OF PLACENTA IN FIRST TRIMESTER, SINGLE OR UNSPECIFIED FETUS: Primary | ICD-10-CM

## 2021-07-29 DIAGNOSIS — O46.8X1 SUBCHORIONIC HEMORRHAGE OF PLACENTA IN FIRST TRIMESTER, SINGLE OR UNSPECIFIED FETUS: Primary | ICD-10-CM

## 2021-07-29 LAB
ALBUMIN SERPL-MCNC: 3.7 G/DL (ref 3.4–5)
ALBUMIN/GLOB SERPL: 1 {RATIO} (ref 0.8–1.7)
ALP SERPL-CCNC: 71 U/L (ref 45–117)
ALT SERPL-CCNC: 40 U/L (ref 13–56)
ANION GAP SERPL CALC-SCNC: 1 MMOL/L (ref 3–18)
APPEARANCE UR: CLEAR
AST SERPL-CCNC: 20 U/L (ref 10–38)
BASOPHILS # BLD: 0 K/UL (ref 0–0.1)
BASOPHILS NFR BLD: 0 % (ref 0–2)
BILIRUB SERPL-MCNC: 0.2 MG/DL (ref 0.2–1)
BILIRUB UR QL: NEGATIVE
BNP SERPL-MCNC: 99 PG/ML (ref 0–450)
BUN SERPL-MCNC: 12 MG/DL (ref 7–18)
BUN/CREAT SERPL: 18 (ref 12–20)
CALCIUM SERPL-MCNC: 9.6 MG/DL (ref 8.5–10.1)
CHLORIDE SERPL-SCNC: 108 MMOL/L (ref 100–111)
CK MB CFR SERPL CALC: NORMAL % (ref 0–4)
CK MB SERPL-MCNC: <1 NG/ML (ref 5–25)
CK SERPL-CCNC: 112 U/L (ref 26–192)
CO2 SERPL-SCNC: 28 MMOL/L (ref 21–32)
COLOR UR: YELLOW
CREAT SERPL-MCNC: 0.67 MG/DL (ref 0.6–1.3)
DIFFERENTIAL METHOD BLD: ABNORMAL
EOSINOPHIL # BLD: 0.1 K/UL (ref 0–0.4)
EOSINOPHIL NFR BLD: 1 % (ref 0–5)
ERYTHROCYTE [DISTWIDTH] IN BLOOD BY AUTOMATED COUNT: 14.8 % (ref 11.6–14.5)
GLOBULIN SER CALC-MCNC: 3.6 G/DL (ref 2–4)
GLUCOSE SERPL-MCNC: 79 MG/DL (ref 74–99)
GLUCOSE UR STRIP.AUTO-MCNC: NEGATIVE MG/DL
HCG SERPL-ACNC: ABNORMAL MIU/ML (ref 0–10)
HCG UR QL: POSITIVE
HCT VFR BLD AUTO: 37.1 % (ref 35–45)
HGB BLD-MCNC: 12.2 G/DL (ref 12–16)
HGB UR QL STRIP: NEGATIVE
KETONES UR QL STRIP.AUTO: 15 MG/DL
LEUKOCYTE ESTERASE UR QL STRIP.AUTO: NEGATIVE
LIPASE SERPL-CCNC: 100 U/L (ref 73–393)
LYMPHOCYTES # BLD: 1.9 K/UL (ref 0.9–3.6)
LYMPHOCYTES NFR BLD: 24 % (ref 21–52)
MCH RBC QN AUTO: 30.7 PG (ref 24–34)
MCHC RBC AUTO-ENTMCNC: 32.9 G/DL (ref 31–37)
MCV RBC AUTO: 93.2 FL (ref 74–97)
MONOCYTES # BLD: 0.5 K/UL (ref 0.05–1.2)
MONOCYTES NFR BLD: 6 % (ref 3–10)
NEUTS SEG # BLD: 5.4 K/UL (ref 1.8–8)
NEUTS SEG NFR BLD: 69 % (ref 40–73)
NITRITE UR QL STRIP.AUTO: NEGATIVE
PH UR STRIP: 5.5 [PH] (ref 5–8)
PLATELET # BLD AUTO: 211 K/UL (ref 135–420)
PMV BLD AUTO: 10.7 FL (ref 9.2–11.8)
POTASSIUM SERPL-SCNC: 3.9 MMOL/L (ref 3.5–5.5)
PROT SERPL-MCNC: 7.3 G/DL (ref 6.4–8.2)
PROT UR STRIP-MCNC: NEGATIVE MG/DL
RBC # BLD AUTO: 3.98 M/UL (ref 4.2–5.3)
SERVICE CMNT-IMP: NORMAL
SODIUM SERPL-SCNC: 137 MMOL/L (ref 136–145)
SP GR UR REFRACTOMETRY: 1.01 (ref 1–1.03)
TROPONIN I SERPL-MCNC: <0.02 NG/ML (ref 0–0.04)
UROBILINOGEN UR QL STRIP.AUTO: 0.2 EU/DL (ref 0.2–1)
WBC # BLD AUTO: 7.9 K/UL (ref 4.6–13.2)
WET PREP GENITAL: NORMAL

## 2021-07-29 PROCEDURE — 76817 TRANSVAGINAL US OBSTETRIC: CPT

## 2021-07-29 PROCEDURE — 82553 CREATINE MB FRACTION: CPT

## 2021-07-29 PROCEDURE — 85025 COMPLETE CBC W/AUTO DIFF WBC: CPT

## 2021-07-29 PROCEDURE — 83690 ASSAY OF LIPASE: CPT

## 2021-07-29 PROCEDURE — 83880 ASSAY OF NATRIURETIC PEPTIDE: CPT

## 2021-07-29 PROCEDURE — 93005 ELECTROCARDIOGRAM TRACING: CPT

## 2021-07-29 PROCEDURE — 71045 X-RAY EXAM CHEST 1 VIEW: CPT

## 2021-07-29 PROCEDURE — 99284 EMERGENCY DEPT VISIT MOD MDM: CPT

## 2021-07-29 PROCEDURE — 84702 CHORIONIC GONADOTROPIN TEST: CPT

## 2021-07-29 PROCEDURE — 93970 EXTREMITY STUDY: CPT

## 2021-07-29 PROCEDURE — 81003 URINALYSIS AUTO W/O SCOPE: CPT

## 2021-07-29 PROCEDURE — 81025 URINE PREGNANCY TEST: CPT

## 2021-07-29 PROCEDURE — 87491 CHLMYD TRACH DNA AMP PROBE: CPT

## 2021-07-29 PROCEDURE — 87210 SMEAR WET MOUNT SALINE/INK: CPT

## 2021-07-29 PROCEDURE — 80053 COMPREHEN METABOLIC PANEL: CPT

## 2021-07-29 NOTE — ED TRIAGE NOTES
Patient ambulatory into ER c/o Heavy abd pain, today burning sensation when urinate. Patient took at home test 3 days ago and was positive.

## 2021-07-29 NOTE — ED NOTES
Patient arrives with complaints of lower abdominal pain that began last night and pain upon urination. Patient states she took pregnancy test 3 days ago and was positive. Patient states she has been pregnant 3 other times and had miscarriages all 3 times. Patient endorses pink discharge. Patient's last period in May.

## 2021-07-29 NOTE — ED PROVIDER NOTES
EMERGENCY DEPARTMENT HISTORY AND PHYSICAL EXAM    Date: 7/29/2021  Patient Name: Mallory Ortiz    History of Presenting Illness     Chief Complaint   Patient presents with    Abdominal Pain         History Provided By: Patient    Chief Complaint: abd pain and dysuria       Additional History (Context):   3:42 PM  Mallory Ortiz is a 39 y.o. female with Marsa Zaire presents to the emergency department C/O lower abdominal pelvic pain and dysuria that started 3 days ago. Patient noted some pinkish discharge yesterday. Patient states she took a pregnancy test at home 3 days ago and was positive. Last menstrual period was in May unsure of how far along she is. Denies fevers nausea vomiting or back pain. Patient also notes that she has had feelings of palpitations occasionally but denies chest pain or shortness of breath. She also notes that she has had bilateral lower extremity swelling after traveling recently. That lasted about 2 hours. No history of blood clots. She has not been seen by OBGYN at this current pregnancy. Denies any abdominal surgeries in the past.      PCP: None    Current Outpatient Medications   Medication Sig Dispense Refill    ondansetron (Zofran ODT) 4 mg disintegrating tablet Take 1 Tablet by mouth every eight (8) hours as needed for Nausea (or vomiting.). Allow to dissolve on tongue 20 Tablet 0       Past History     Past Medical History:  History reviewed. No pertinent past medical history. Past Surgical History:  History reviewed. No pertinent surgical history. Family History:  History reviewed. No pertinent family history. Social History:  Social History     Tobacco Use    Smoking status: Never Smoker    Smokeless tobacco: Never Used   Substance Use Topics    Alcohol use: Not Currently    Drug use: Never       Allergies:  No Known Allergies    Review of Systems   Review of Systems   Constitutional: Negative for chills and fever.    Respiratory: Negative for cough and shortness of breath. Cardiovascular: Positive for palpitations and leg swelling. Negative for chest pain. Gastrointestinal: Positive for abdominal pain. Negative for diarrhea, nausea and vomiting. Genitourinary: Positive for dysuria and pelvic pain. Negative for difficulty urinating, dyspareunia, flank pain, frequency and hematuria. Musculoskeletal: Negative for back pain. Neurological: Negative for weakness and numbness. All other systems reviewed and are negative. Physical Exam     Vitals:    07/29/21 1438 07/29/21 1615 07/29/21 1700 07/29/21 1830   BP: 125/79 137/77 121/69 123/66   Pulse: 95 95 92 93   Resp: 18 17 19 23   Temp: 99.2 °F (37.3 °C)      SpO2: 100% 100% 100% 100%   Weight: 90.7 kg (200 lb)      Height: 5' 2.99\" (1.6 m)        Physical Exam  Vitals and nursing note reviewed. Constitutional:       Appearance: She is well-developed. Comments: alert Lying on stretcher, nontoxic, no acute distress   HENT:      Head: Normocephalic and atraumatic. Cardiovascular:      Rate and Rhythm: Normal rate and regular rhythm. Heart sounds: Normal heart sounds. No murmur heard. Pulmonary:      Effort: Pulmonary effort is normal. No respiratory distress. Breath sounds: Normal breath sounds. No wheezing or rales. Abdominal:      General: Bowel sounds are normal.      Palpations: Abdomen is soft. Tenderness: There is abdominal tenderness in the suprapubic area. There is no right CVA tenderness or left CVA tenderness. Genitourinary:     Comments: See pelvic exam procedure note  Musculoskeletal:      Cervical back: Normal range of motion and neck supple. Comments: Calves slightly swollen but no pitting edema or tenderness, pulses 2+ for DP and PT   Neurological:      Mental Status: She is alert and oriented to person, place, and time.    Psychiatric:         Judgment: Judgment normal.         Diagnostic Study Results     Labs:   No results found for this or any previous visit (from the past 12 hour(s)). Radiologic Studies:   DUPLEX LOWER EXT VENOUS BILAT   Final Result      US OB TV W DOPPLER   Final Result      1. Single living intrauterine gestation measuring 5 weeks 6 days. Small   subchorionic hemorrhage is present. Recommend continued close clinical follow-up   with sonography is needed. 2.  Normal right ovary. 3.  The left ovary is not visualized and therefore cannot be assessed (left   ovarian torsion cannot be excluded). XR CHEST PORT   Final Result      No acute findings in the chest.        CT Results  (Last 48 hours)    None        CXR Results  (Last 48 hours)               07/29/21 1642  XR CHEST PORT Final result    Impression:      No acute findings in the chest.       Narrative:  EXAM: PORTABLE  FRONTAL CHEST RADIOGRAPH       CLINICAL INDICATION/HISTORY: Severe pain, positive pregnancy test 3 days prior       COMPARISON: None       TECHNIQUE: Portable frontal view of the chest       _______________       FINDINGS:       SUPPORT DEVICES: EKG leads overlie the patient. HEART AND MEDIASTINUM: Normal heart size and mediastinal contours. LUNGS: No suspicious pulmonary opacities. PLEURAL SPACES:No large pneumothorax. No large pleural effusion. BONY THORAX AND SOFT TISSUES: No acute abnormality. _______________                 Medical Decision Making   I am the first provider for this patient. I reviewed the vital signs, available nursing notes, past medical history, past surgical history, family history and social history. Vital Signs: Reviewed the patient's vital signs. Pulse Oximetry Analysis: 100% on RA     EKG interpretation: (Preliminary)  3:42 PM   Sinus rhythm rate 92 bpm incomplete right bundle branch block no STEMI    Records Reviewed: Nursing Notes and Old Medical Records    Procedures:  Pelvic Exam    Date/Time: 7/29/2021 4:40 PM  Performed by: PA  Procedure duration:  5 minutes.   Type of exam performed: bimanual and speculum. External genitalia appearance: normal.    Vaginal exam:  bleeding. Bleeding: mild  Cervical exam:  os closed. Specimen(s) collected:  chlamydia, GC and vaginal culture. Bimanual exam:  normal.    Patient tolerance: patient tolerated the procedure well with no immediate complications          ED Course:   3:42 PM Initial assessment performed. The patients presenting problems have been discussed, and they are in agreement with the care plan formulated and outlined with them. I have encouraged them to ask questions as they arise throughout their visit. Discussion:  Pt presents with lower abdominal cramping and dysuria. Patient states she just found out that she was pregnant but unsure of how far along. Patient also complaining of some palpitations that started today. Also noted that she had some bilateral leg swelling that started after she went on a trip recently. Overall benign exam with mild suprapubic tenderness. Labs within normal limits. Patient had ultrasound that showed subchorionic bleed but live IUP, ovary unable to be visualized but low suspicion for torsion. Blood type Rh+. UA normal wet prep normal.  Bilateral PVLs negative for DVT. Patient occasionally tachycardic but denies chest pain or shortness of breath. No hypoxia. Low suspicion for PE. Will have patient follow-up with OB/GYN. Strict return precautions given, pt offering no questions or complaints. Diagnosis and Disposition     DISCHARGE NOTE:  Selam Bustos's  results have been reviewed with her. She has been counseled regarding her diagnosis, treatment, and plan. She verbally conveys understanding and agreement of the signs, symptoms, diagnosis, treatment and prognosis and additionally agrees to follow up as discussed. She also agrees with the care-plan and conveys that all of her questions have been answered.   I have also provided discharge instructions for her that include: educational information regarding their diagnosis and treatment, and list of reasons why they would want to return to the ED prior to their follow-up appointment, should her condition change. She has been provided with education for proper emergency department utilization. CLINICAL IMPRESSION:    1. Subchorionic hemorrhage of placenta in first trimester, single or unspecified fetus    2. Abdominal pain during pregnancy in first trimester    3. Palpitations        PLAN:  1. D/C Home  2. Discharge Medication List as of 7/29/2021  9:50 PM        3. Follow-up Information     Follow up With Specialties Details Why Viridiana Naylor MD Obstetrics & Gynecology, Gynecology, Obstetrics Schedule an appointment as soon as possible for a visit   [address] [facility] [address] [address] [phone]      [facility] EMERGENCY DEPT Emergency Medicine  If symptoms worsen [address] [address] [phone]              Please note that this dictation was completed with [employer], the computer voice recognition software. Quite often unanticipated grammatical, syntax, homophones, and other interpretive errors are inadvertently transcribed by the computer software. Please disregard these errors. Please excuse any errors that have escaped final proofreading.

## 2021-07-31 LAB
ATRIAL RATE: 92 BPM
CALCULATED P AXIS, ECG09: 54 DEGREES
CALCULATED R AXIS, ECG10: 4 DEGREES
CALCULATED T AXIS, ECG11: 19 DEGREES
DIAGNOSIS, 93000: NORMAL
P-R INTERVAL, ECG05: 116 MS
Q-T INTERVAL, ECG07: 350 MS
QRS DURATION, ECG06: 94 MS
QTC CALCULATION (BEZET), ECG08: 432 MS
VENTRICULAR RATE, ECG03: 92 BPM

## 2021-08-02 LAB
C TRACH RRNA SPEC QL NAA+PROBE: NEGATIVE
N GONORRHOEA RRNA SPEC QL NAA+PROBE: NEGATIVE
PLEASE NOTE:, 188601: NORMAL
SPECIMEN SOURCE: NORMAL

## 2021-09-09 ENCOUNTER — HOSPITAL ENCOUNTER (EMERGENCY)
Age: 36
Discharge: HOME | End: 2021-09-09
Attending: EMERGENCY MEDICINE

## 2021-09-09 ENCOUNTER — APPOINTMENT (OUTPATIENT)
Dept: ULTRASOUND IMAGING | Age: 36
End: 2021-09-09
Attending: EMERGENCY MEDICINE

## 2021-09-09 VITALS
SYSTOLIC BLOOD PRESSURE: 116 MMHG | HEART RATE: 80 BPM | TEMPERATURE: 98.4 F | RESPIRATION RATE: 18 BRPM | BODY MASS INDEX: 38.98 KG/M2 | OXYGEN SATURATION: 100 % | WEIGHT: 220 LBS | DIASTOLIC BLOOD PRESSURE: 64 MMHG | HEIGHT: 63 IN

## 2021-09-09 DIAGNOSIS — O26.891 PELVIC PAIN AFFECTING PREGNANCY IN FIRST TRIMESTER, ANTEPARTUM: Primary | ICD-10-CM

## 2021-09-09 DIAGNOSIS — R10.2 PELVIC PAIN AFFECTING PREGNANCY IN FIRST TRIMESTER, ANTEPARTUM: Primary | ICD-10-CM

## 2021-09-09 LAB
ABO + RH BLD: NORMAL
ALBUMIN SERPL-MCNC: 3.3 G/DL (ref 3.4–5)
ALBUMIN/GLOB SERPL: 0.8 {RATIO} (ref 0.8–1.7)
ALP SERPL-CCNC: 58 U/L (ref 45–117)
ALT SERPL-CCNC: 22 U/L (ref 13–56)
ANION GAP SERPL CALC-SCNC: 8 MMOL/L (ref 3–18)
APPEARANCE UR: NORMAL
AST SERPL-CCNC: 20 U/L (ref 10–38)
BASOPHILS # BLD: 0 K/UL (ref 0–0.1)
BASOPHILS NFR BLD: 0 % (ref 0–2)
BILIRUB SERPL-MCNC: 0.3 MG/DL (ref 0.2–1)
BILIRUB UR QL: NEGATIVE
BUN SERPL-MCNC: 7 MG/DL (ref 7–18)
BUN/CREAT SERPL: 13 (ref 12–20)
CALCIUM SERPL-MCNC: 9.3 MG/DL (ref 8.5–10.1)
CHLORIDE SERPL-SCNC: 105 MMOL/L (ref 100–111)
CO2 SERPL-SCNC: 25 MMOL/L (ref 21–32)
COLOR UR: YELLOW
CREAT SERPL-MCNC: 0.54 MG/DL (ref 0.6–1.3)
DIFFERENTIAL METHOD BLD: ABNORMAL
EOSINOPHIL # BLD: 0 K/UL (ref 0–0.4)
EOSINOPHIL NFR BLD: 0 % (ref 0–5)
ERYTHROCYTE [DISTWIDTH] IN BLOOD BY AUTOMATED COUNT: 14.5 % (ref 11.6–14.5)
GLOBULIN SER CALC-MCNC: 3.9 G/DL (ref 2–4)
GLUCOSE SERPL-MCNC: 69 MG/DL (ref 74–99)
GLUCOSE UR STRIP.AUTO-MCNC: NEGATIVE MG/DL
HCG SERPL-ACNC: ABNORMAL MIU/ML (ref 0–10)
HCT VFR BLD AUTO: 37.7 % (ref 35–45)
HGB BLD-MCNC: 12.4 G/DL (ref 12–16)
HGB UR QL STRIP: NEGATIVE
KETONES UR QL STRIP.AUTO: NEGATIVE MG/DL
LEUKOCYTE ESTERASE UR QL STRIP.AUTO: NEGATIVE
LYMPHOCYTES # BLD: 2.3 K/UL (ref 0.9–3.6)
LYMPHOCYTES NFR BLD: 25 % (ref 21–52)
MCH RBC QN AUTO: 30.9 PG (ref 24–34)
MCHC RBC AUTO-ENTMCNC: 32.9 G/DL (ref 31–37)
MCV RBC AUTO: 94 FL (ref 78–100)
MONOCYTES # BLD: 0.4 K/UL (ref 0.05–1.2)
MONOCYTES NFR BLD: 4 % (ref 3–10)
NEUTS SEG # BLD: 6.3 K/UL (ref 1.8–8)
NEUTS SEG NFR BLD: 70 % (ref 40–73)
NITRITE UR QL STRIP.AUTO: NEGATIVE
PH UR STRIP: 7 [PH] (ref 5–8)
PLATELET # BLD AUTO: 232 K/UL (ref 135–420)
PMV BLD AUTO: 10.2 FL (ref 9.2–11.8)
POTASSIUM SERPL-SCNC: 4.3 MMOL/L (ref 3.5–5.5)
PROT SERPL-MCNC: 7.2 G/DL (ref 6.4–8.2)
PROT UR STRIP-MCNC: NEGATIVE MG/DL
RBC # BLD AUTO: 4.01 M/UL (ref 4.2–5.3)
SERVICE CMNT-IMP: NORMAL
SODIUM SERPL-SCNC: 138 MMOL/L (ref 136–145)
SP GR UR REFRACTOMETRY: 1.02 (ref 1–1.03)
UROBILINOGEN UR QL STRIP.AUTO: 0.2 EU/DL (ref 0.2–1)
WBC # BLD AUTO: 9 K/UL (ref 4.6–13.2)
WET PREP GENITAL: NORMAL

## 2021-09-09 PROCEDURE — 80053 COMPREHEN METABOLIC PANEL: CPT

## 2021-09-09 PROCEDURE — 81003 URINALYSIS AUTO W/O SCOPE: CPT

## 2021-09-09 PROCEDURE — 99284 EMERGENCY DEPT VISIT MOD MDM: CPT

## 2021-09-09 PROCEDURE — 86900 BLOOD TYPING SEROLOGIC ABO: CPT

## 2021-09-09 PROCEDURE — 76817 TRANSVAGINAL US OBSTETRIC: CPT

## 2021-09-09 PROCEDURE — 87491 CHLMYD TRACH DNA AMP PROBE: CPT

## 2021-09-09 PROCEDURE — 85025 COMPLETE CBC W/AUTO DIFF WBC: CPT

## 2021-09-09 PROCEDURE — 84702 CHORIONIC GONADOTROPIN TEST: CPT

## 2021-09-09 PROCEDURE — 87210 SMEAR WET MOUNT SALINE/INK: CPT

## 2021-09-09 NOTE — ED TRIAGE NOTES
Pt states \" I am having a strong pain in my lower abdomen and I am 11.5 weeks pregnant. I don't have an OBGYN yet. It is my 7th pregnancy but I have only 3 live births. \"

## 2021-09-09 NOTE — ED PROVIDER NOTES
EMERGENCY DEPARTMENT HISTORY AND PHYSICAL EXAM    Date: 9/9/2021  Patient Name: Philip Adan    History of Presenting Illness     Chief Complaint   Patient presents with    Pregnancy Problem         History Provided By: Patient, history obtained via video     9:09 AM  Philip Adan is a 39 y.o. female with PMHX of G7, P3, approximately 11 weeks pregnant who presents to the emergency department C/O abdominal pain. Patient reports over the past week she has had progressively worsening low abdominal pain. She reports is worse when she is standing for long periods of time and better when she rests. She denies any fever, chest pain, shortness of breath, bowel or urinary complaints, vaginal bleeding or discharge. Does note nausea and vomiting. Has not yet had prenatal care for this pregnancy but reports she had ultrasound in July that confirmed an IUP approximately 5 weeks at that time. PCP: None        Past History     Past Medical History:  Past Medical History:   Diagnosis Date    Miscarriage        Past Surgical History:  History reviewed. No pertinent surgical history. Family History:  History reviewed. No pertinent family history. Social History:  Social History     Tobacco Use    Smoking status: Never Smoker    Smokeless tobacco: Never Used   Substance Use Topics    Alcohol use: Not Currently    Drug use: Never       Allergies:  No Known Allergies      Review of Systems   Review of Systems   Constitutional: Negative for fever. Respiratory: Negative for shortness of breath. Cardiovascular: Negative for chest pain. Gastrointestinal: Positive for abdominal pain, nausea and vomiting. Genitourinary: Negative for vaginal bleeding and vaginal discharge. All other systems reviewed and are negative.         Physical Exam     Vitals:    09/09/21 0856 09/09/21 0858 09/09/21 1030 09/09/21 1045   BP:  119/72 110/71 116/64   Pulse:  80     Resp:  18 Temp:  98.4 °F (36.9 °C)     SpO2:  100% 100% 100%   Weight: 99.8 kg (220 lb)      Height: 5' 2.99\" (1.6 m)        Physical Exam    Nursing notes and vital signs reviewed    Constitutional: Non toxic appearing, obese, moderate distress  Head: Normocephalic, Atraumatic  Eyes: EOMI  Neck: Supple  Cardiovascular: Regular rate and rhythm, no murmurs, rubs, or gallops  Chest: Normal work of breathing and chest excursion bilaterally  Lungs: Clear to ausculation bilaterally  Abdomen: Soft, suprapubic tenderness without rebound or guarding, otherwise nontender, non distended, normoactive bowel sounds  Pelvic: See below  Back: No evidence of trauma or deformity  Extremities: No evidence of trauma or deformity  Skin: Warm and dry, normal cap refill  Neuro: Alert and appropriate  Psychiatric: Normal mood and affect      Diagnostic Study Results     Labs -     Recent Results (from the past 12 hour(s))   WET PREP    Collection Time: 09/09/21 10:04 AM    Specimen: Vagina   Result Value Ref Range    Special Requests: NO SPECIAL REQUESTS      Wet prep NO YEAST,TRICHOMONAS OR CLUE CELLS NOTED     CBC WITH AUTOMATED DIFF    Collection Time: 09/09/21 10:04 AM   Result Value Ref Range    WBC 9.0 4.6 - 13.2 K/uL    RBC 4.01 (L) 4.20 - 5.30 M/uL    HGB 12.4 12.0 - 16.0 g/dL    HCT 37.7 35.0 - 45.0 %    MCV 94.0 78.0 - 100.0 FL    MCH 30.9 24.0 - 34.0 PG    MCHC 32.9 31.0 - 37.0 g/dL    RDW 14.5 11.6 - 14.5 %    PLATELET 087 504 - 729 K/uL    MPV 10.2 9.2 - 11.8 FL    NEUTROPHILS 70 40 - 73 %    LYMPHOCYTES 25 21 - 52 %    MONOCYTES 4 3 - 10 %    EOSINOPHILS 0 0 - 5 %    BASOPHILS 0 0 - 2 %    ABS. NEUTROPHILS 6.3 1.8 - 8.0 K/UL    ABS. LYMPHOCYTES 2.3 0.9 - 3.6 K/UL    ABS. MONOCYTES 0.4 0.05 - 1.2 K/UL    ABS. EOSINOPHILS 0.0 0.0 - 0.4 K/UL    ABS.  BASOPHILS 0.0 0.0 - 0.1 K/UL    DF AUTOMATED     BLOOD TYPE, (ABO+RH)    Collection Time: 09/09/21 10:04 AM   Result Value Ref Range    ABO/Rh(D) A POSITIVE    METABOLIC PANEL, COMPREHENSIVE    Collection Time: 09/09/21 10:04 AM   Result Value Ref Range    Sodium 138 136 - 145 mmol/L    Potassium 4.3 3.5 - 5.5 mmol/L    Chloride 105 100 - 111 mmol/L    CO2 25 21 - 32 mmol/L    Anion gap 8 3.0 - 18 mmol/L    Glucose 69 (L) 74 - 99 mg/dL    BUN 7 7.0 - 18 MG/DL    Creatinine 0.54 (L) 0.6 - 1.3 MG/DL    BUN/Creatinine ratio 13 12 - 20      GFR est AA >60 >60 ml/min/1.73m2    GFR est non-AA >60 >60 ml/min/1.73m2    Calcium 9.3 8.5 - 10.1 MG/DL    Bilirubin, total 0.3 0.2 - 1.0 MG/DL    ALT (SGPT) 22 13 - 56 U/L    AST (SGOT) 20 10 - 38 U/L    Alk. phosphatase 58 45 - 117 U/L    Protein, total 7.2 6.4 - 8.2 g/dL    Albumin 3.3 (L) 3.4 - 5.0 g/dL    Globulin 3.9 2.0 - 4.0 g/dL    A-G Ratio 0.8 0.8 - 1.7     BETA HCG, QT    Collection Time: 09/09/21 10:04 AM   Result Value Ref Range    Beta HCG, QT 67,523 (H) 0 - 10 MIU/ML   URINALYSIS W/ RFLX MICROSCOPIC    Collection Time: 09/09/21 10:04 AM   Result Value Ref Range    Color YELLOW      Appearance CLOUDY      Specific gravity 1.017 1.005 - 1.030      pH (UA) 7.0 5.0 - 8.0      Protein Negative NEG mg/dL    Glucose Negative NEG mg/dL    Ketone Negative NEG mg/dL    Bilirubin Negative NEG      Blood Negative NEG      Urobilinogen 0.2 0.2 - 1.0 EU/dL    Nitrites Negative NEG      Leukocyte Esterase Negative NEG         Radiologic Studies -   US OB TV W DOPPLER   Final Result      Single viable intrauterine pregnancy as described. No acute findings. CT Results  (Last 48 hours)    None        CXR Results  (Last 48 hours)    None          Medications given in the ED-  Medications - No data to display      Medical Decision Making   I am the first provider for this patient. I reviewed the vital signs, available nursing notes, past medical history, past surgical history, family history and social history. Vital Signs-Reviewed the patient's vital signs.     Pulse Oximetry Analysis - 100% on room air, not hypoxic     Records Reviewed: Nursing Notes    Provider Notes (Medical Decision Making): Jordin Worthington is a 39 y.o. female presenting for pelvic pain in pregnancy. Patient hemodynamically stable. Rh+. No acute process identified on exam, labs, imaging. Provided patient with reassurance and referral to OB/GYN for follow-up with return precautions. Patient understands and agrees with this plan. Procedures:  Procedures    ED Course:   10:41 AM  Exam was chaperoned by Samir Garcia RN  Normal external genitalia. Physiologic discharge in the vault. Normal cervix appearance. On bimanual exam, no cervical, or adnexal tenderness. + uterine tenderness. 12:23 PM  Updated patient on all results and plan via video . All questions answered. Diagnosis and Disposition     Critical Care: None    DISCHARGE NOTE:    Benita Bustos's  results have been reviewed with her. She has been counseled regarding her diagnosis, treatment, and plan. She verbally conveys understanding and agreement of the signs, symptoms, diagnosis, treatment and prognosis and additionally agrees to follow up as discussed. She also agrees with the care-plan and conveys that all of her questions have been answered. I have also provided discharge instructions for her that include: educational information regarding their diagnosis and treatment, and list of reasons why they would want to return to the ED prior to their follow-up appointment, should her condition change. She has been provided with education for proper emergency department utilization. CLINICAL IMPRESSION:    1. Pelvic pain affecting pregnancy in first trimester, antepartum        PLAN:  1. D/C Home  2. There are no discharge medications for this patient.     3.   Follow-up Information     Follow up With Specialties Details Why Contact Info    Erik Edgar MD Obstetrics & Gynecology Schedule an appointment as soon as possible for a visit  [facility] [id number] [facility] [address]      [facility] EMERGENCY DEPT Emergency Medicine  If symptoms worsen [address] [place] [id number]  [phone]        _______________________________      Please note that this dictation was completed with [employer], the computer voice recognition software. Quite often unanticipated grammatical, syntax, homophones, and other interpretive errors are inadvertently transcribed by the computer software. Please disregard these errors. Please excuse any errors that have escaped final proofreading.

## 2021-09-11 LAB
C TRACH RRNA SPEC QL NAA+PROBE: NEGATIVE
N GONORRHOEA RRNA SPEC QL NAA+PROBE: NEGATIVE

## 2021-12-19 ENCOUNTER — HOSPITAL ENCOUNTER (OUTPATIENT)
Age: 36
Discharge: HOME | End: 2021-12-19
Attending: OBSTETRICS & GYNECOLOGY | Admitting: OBSTETRICS & GYNECOLOGY
Payer: MEDICAID

## 2021-12-19 VITALS
RESPIRATION RATE: 16 BRPM | HEIGHT: 63 IN | WEIGHT: 220 LBS | BODY MASS INDEX: 38.98 KG/M2 | SYSTOLIC BLOOD PRESSURE: 117 MMHG | HEART RATE: 98 BPM | TEMPERATURE: 99 F | DIASTOLIC BLOOD PRESSURE: 56 MMHG

## 2021-12-19 LAB
APPEARANCE UR: CLEAR
BILIRUB UR QL: NEGATIVE
COLOR UR: YELLOW
GLUCOSE UR QL STRIP.AUTO: NEGATIVE MG/DL
KETONES UR-MCNC: ABNORMAL MG/DL
LEUKOCYTE ESTERASE UR QL STRIP: ABNORMAL
NITRITE UR QL: NEGATIVE
PH UR: 7 [PH] (ref 5–9)
PROT UR QL: NEGATIVE MG/DL
RBC # UR STRIP: NEGATIVE /UL
SERVICE CMNT-IMP: ABNORMAL
SP GR UR: 1.02 (ref 1–1.02)
UROBILINOGEN UR QL: 0.2 EU/DL (ref 0.2–1)

## 2021-12-19 PROCEDURE — 74011250636 HC RX REV CODE- 250/636: Performed by: OBSTETRICS & GYNECOLOGY

## 2021-12-19 PROCEDURE — 96360 HYDRATION IV INFUSION INIT: CPT

## 2021-12-19 PROCEDURE — 87086 URINE CULTURE/COLONY COUNT: CPT

## 2021-12-19 PROCEDURE — 87210 SMEAR WET MOUNT SALINE/INK: CPT

## 2021-12-19 PROCEDURE — 87147 CULTURE TYPE IMMUNOLOGIC: CPT

## 2021-12-19 PROCEDURE — 99283 EMERGENCY DEPT VISIT LOW MDM: CPT

## 2021-12-19 PROCEDURE — 81003 URINALYSIS AUTO W/O SCOPE: CPT

## 2021-12-19 PROCEDURE — 74011250637 HC RX REV CODE- 250/637: Performed by: OBSTETRICS & GYNECOLOGY

## 2021-12-19 RX ORDER — ACETAMINOPHEN 500 MG
1000 TABLET ORAL ONCE
Status: COMPLETED | OUTPATIENT
Start: 2021-12-19 | End: 2021-12-19

## 2021-12-19 RX ORDER — ACETAMINOPHEN 325 MG/1
325 TABLET ORAL
COMMUNITY

## 2021-12-19 RX ADMIN — SODIUM CHLORIDE, POTASSIUM CHLORIDE, SODIUM LACTATE AND CALCIUM CHLORIDE 1000 ML: 600; 310; 30; 20 INJECTION, SOLUTION INTRAVENOUS at 13:57

## 2021-12-19 RX ADMIN — ACETAMINOPHEN 1000 MG: 500 TABLET ORAL at 13:47

## 2021-12-19 NOTE — ROUTINE PROCESS
Pt up to BR, states she no longer has pain since taking tylenol and receiving IV bolus.  notified of this and of lab results. Order received for SVE.

## 2021-12-19 NOTE — DISCHARGE INSTRUCTIONS
Patient Education        Precauciones en Kimberly Po: Instrucciones de cuidado  Pregnancy Precautions: Care Instructions  Instrucciones de cuidado     No hay mike manera pitt de prevenir el trabajo de parto antes de la fecha esperada (trabajo de parto prematuro) o de prevenir la mayoría de otros problemas en el [place]. Christiano hay cosas que puede hacer para aumentar las probabilidades de tener un embarazo saludable. Vaya a dori citas, siga los consejos de rodriguez médico y cuídese. Coma lobo y ismael ejercicio (si rodriguez médico lo permite). Y asegúrese de paulino abundante agua. La atención de seguimiento es mike parte clave de rodriguez tratamiento y seguridad. Asegúrese de hacer y acudir a todas las citas, y llame a rodriguez médico si está teniendo problemas. También es mike buena idea saber los resultados de dori exámenes y mantener mike lista de los medicamentos que tressa. ¿Cómo puede cuidarse en el hogar? · Asegúrese de asistir a las citas prenatales. Rodriguez médico le tomará la presión arterial en cada consulta. Rodriguez médico también comprobará si tiene proteínas en rodriguez orina. Tanto la presión arterial sudeep kemi la presencia de proteínas en la orina son señales de preeclampsia. Esta afección puede ser peligrosa tanto para usted kemi para rodriguez bebé. · Mikki mucho líquido. La deshidratación puede causar contracciones. Si tiene mike enfermedad renal, cardíaca o hepática y tiene que restringir los líquidos, hable con rodriguez médico antes de aumentar la cantidad de líquido que jil. · Informe a rodriguez médico de inmediato si nota algún síntoma de infección, kemi:  ? Ardor cuando orina. ? Flujo con mal olor de la vagina. ? Comezón en la vagina. ? Eleazar Knight sin explicación. ? Dolor o sensibilidad inusual en el útero o la parte baja del abdomen. · Aliméntese en forma equilibrada. Incluya muchos alimentos con alto contenido de calcio y salvador. ? Entre los alimentos ricos en calcio se incluyen la [place], el queso, el yoggalindo, Alee Bauer y el brócoli. ?  [employer] alimentos ricos en salvador se incluyen las argenis ortiz, los mariscos, las aves, los [place], los frijoles, las uvas pasas, el pan de grano integral y las verduras de hojas verdes. · No fume. Si necesita ayuda para dejar de fumar, hable con fang médico sobre programas y medicamentos para dejar de fumar. Estos pueden aumentar dori probabilidades de dejar el hábito para siempre. · No pricilla alcohol ni consuma marihuana o drogas ilegales. · Siga las instrucciones de fang médico acerca de la [place]. Fang médico le dirá cuánto ejercicio puede hacer. · Pregúntele a fang médico si puede tener [employer]. Si usted está en riesgo de tener trabajo de [place], fang médico podría pedirle que no tenga relaciones sexuales. · [place]augan precauciones para prevenir las caídas. Jodie el embarazo las articulaciones están más sueltas y se tiene menos equilibrio. Los deportes tales kemi el ciclismo, el esquí o el patinaje en línea pueden aumentar el riesgo de caídas. Y no monte a tiffany, lianet en motocicleta, ismael clavados, ismael esquí acuático, bucee, ni salte en paracaídas mientras está embarazada. · Evite calentarse demasiado. No use saunas ni bañeras de hidromasaje. Evite la exposición al sol en climas calientes por mucho tiempo. [place]augan acetaminofén (Tylenol) para bajar mike fiebre sudeep. · No tome medicamentos de venta landy, productos herbarios ni suplementos sin hablar shannon con fang médico o farmacéutico.  ¿Cuándo debe pedir ayuda? Llame al 911  en cualquier momento que considere que necesita atención de [place]. Por ejemplo, llame si:    · Se desmayó (perdió el conocimiento).     · Tiene convulsiones.     · Tiene sangrado vaginal intenso.     · Tiene dolor intenso en el abdomen o la pelvis.     · Le sale abundante líquido o gotea de la vagina y sabe o jesenia que el cordón umbilical se está saliendo a fang vagina. Si esto sucede, arrodíllese de inmediato, de nory forma que dori nalgas estén más altas que fang jaye.  [place] disminuirá la presión sobre el cordón umbilical hasta que llegue la [facility]. Llame a fang médico ahora mismo o busque atención médica inmediata si:    · Tiene señales de preeclampsia, kemi:  ? Hinchazón repentina de la darío, las андрей o los pies. ? Nuevos problemas de visión (kemi oscurecimiento, jorje borroso o jorje puntos). ? Dolor de jaye intenso.     · Tiene cualquier sangrado vaginal.     · Tiene dolor o cólicos abdominales.     · Tiene fiebre.     · Crispin Stein tenido contracciones regulares (con o sin dolor) por Ilah Scott. [place] significa que tiene 8 o más contracciones en 1 hora o que tiene 4 contracciones o más en 20 minutos después de [place] de posición y paulino líquidos.     · Tiene mike pérdida repentina de líquido por la vagina.     · Tiene dolor en la parte baja de la espalda o presión en la pelvis que no desaparece.     · Nota que fang bebé ha dejado de moverse o se mueve mucho menos de lo normal.   Preste especial atención a los cambios en fang mindy y asegúrese de comunicarse con fang médico si tiene algún problema. ¿Dónde puede encontrar más información en inglés? Swapnil Khoury a http://www.elsa.com/  Quentin Pitts [id number] en la búsqueda para aprender más acerca de \"Precauciones en el embarazo: Instrucciones de cuidado. \"  Revisado: 16 junio, 2021               Versión del contenido: 13.0  © 4118-1886 Healthwise, Incorporated. Las instrucciones de cuidado fueron adaptadas bajo licencia por [employer] (which disclaims liability or warranty for this information). Si usted tiene [place] afección médica o sobre estas instrucciones, siempre pregunte a fang profesional de mindy. [facility] niega toda garantía o responsabilidad por fang uso de esta información. Patient Education        Dolor abdominal sherly el Jaime:  Instrucciones de cuidado  Belly Pain in Pregnancy: Care Instructions  Instrucciones de cuidado     Cuando está embarazada, cualquier dolor abdominal puede ser Glee Kocher preocupación. Es posible que no Jeoffrey Seller a fang médico por cada dolor que tenga. Christiano usted no quiere pasar por alto nada que sea peligroso para usted o fang bebé. Incluso si se siente kemi algo conocido, un dolor abdominal puede significar algo nuevo cuando está embarazada. Es importante saber cuándo llamar al médico. [place] será útil saber cómo cuidarse en el hogar cuando fang dolor no está causado por nada perjudicial.  · Cuando el dolor abdominal es más intenso o agnes, raheel a un médico inmediatamente. · Si está pitt de que fang dolor abdominal es mike señal del [place], llame a fang médico.  · Cuando el dolor abdominal es breve, generalmente es mike parte normal del [place]. Puede estar relacionado con cambios en el útero que está creciendo. O puede deberse al estiramiento de los ligamentos llamados ligamentos redondos. Estos ligamentos ayudan a proporcionar soporte al [employer]. El dolor de los ligamentos redondos puede presentarse en cualquiera de los dos lados del abdomen. También es posible que lo sienta en las caderas o en la sharita. La atención de seguimiento es mike parte clave de fang tratamiento y seguridad. Asegúrese de hacer y acudir a todas las citas, y llame a fang médico si está teniendo problemas. También es mike buena idea saber los resultados de dori exámenes y mantener mike lista de los medicamentos que tressa. ¿Cómo puede saber si fang dolor abdominal es mike señal del Karene Jimi de Maribel? Cuando el dolor abdominal está causado por el Karene Jimi de [place], puede sentirse kemi cólicos leves o similares a los menstruales en la parte inferior del abdomen. Estos cólicos probablemente vikki contracciones. Pueden suceder en el gabriel o tercer trimestre. También podría tener:  · Un dolor persistente y sordo en la parte baja de la espalda, en la pelvis o en los muslos. · Mike sensación de presión en la pelvis o en la parte baja del abdomen.   · Cambios en la secreción vaginal o mike descarga repentina de líquido de la vagina. Si piensa que está en [facility] octavio López, llame a fang médico.  ¿Cómo puede cuidarse en el hogar? Cuando el dolor abdominal es leve y no es un síntoma del trabajo de parto:  · Descanse hasta que se sienta mejor. · Dese un baño de agua tibia. · Piense en lo que come y jil:  ? Mikki mucho líquido. Opte por beber agua y otros líquidos debi hasta que se sienta mejor. ? Pruebe a comer comidas pequeñas y frecuentes. Si tiene [employer], pruebe alimentos suaves y bajos en grasa, kemi arroz sin condimentar, miladys asado, pan orlin y yogur. · Piense en cómo se mueve si está teniendo imelda breves debido al estiramiento de los ligamentos redondos. ? Martina ejercicios de estiramiento suaves. ? Muévase un poco más despacio cuando se dé la vuelta en la cama o cuando se levante de mike silla, para que esos ligamentos no se estiren rápidamente. ? Inclínese un poco hacia adelante si jesenia que va a toser o a estornudar. ¿Cuándo debe pedir ayuda? Llame al 911  en cualquier momento que considere que necesita atención de [place]. Por ejemplo, llame si:    · Tiene dolor repentino e intenso en el abdomen.     · Tiene sangrado vaginal intenso.     · Se desmayó (perdió el conocimiento).     · Tiene convulsiones. Llame a fang médico ahora mismo o busque atención médica inmediata si:    · Tiene dolor nuevo en el abdomen o renetta empeora, o tiene retortijones.     · Tiene cualquier tipo de sangrado vaginal.     · Tiene fiebre.     · Tiene síntomas de preeclampsia, kemi:  ? Hinchazón repentina de la darío, las андрей o los pies. ? Nuevos problemas de visión (kemi oscurecimiento, jorje borroso o jorje puntos). ? Dolor de jaye intenso.     · Piensa que puede oriana comenzado el [place].  [place] significa que ha tenido al menos 8 contracciones dentro de 1 hora o al menos 4 contracciones dentro de 20 minutos, incluso después de que cambia de posición y jil líquidos.     · Tiene síntomas de Ashley Marquez infección urinaria. Estos pueden incluir:  ? Dolor o ardor al orinar. ? Necesidad de orinar con frecuencia sin poder eliminar mucha orina. ? Dolor en el flanco, el cual se siente clay debajo de la caja torácica y por encima de la cintura en cualquiera de los lados de la espalda. ? [employer]. Preste especial atención a los cambios en fang mindy y asegúrese de comunicarse con fang médico si está preocupada por fang mindy o la de fang bebé. ¿Dónde puede encontrar más información en inglés? Vaya a http://www.gray.com/  Escriba [id number] en la búsqueda para aprender más acerca de \"Dolor abdominal sherly el embarazo: Instrucciones de cuidado. \"  Revisado: 16 junio, 2021               Versión del contenido: 13.0  © 1850-3901 Healthwise, Incorporated. Las instrucciones de cuidado fueron adaptadas bajo licencia por [employer] (which disclaims liability or warranty for this information). Si usted tiene [place] afección médica o sobre estas instrucciones, siempre pregunte a fang profesional de mindy. Healthwise, Incorporated niega toda garantía o responsabilidad por fang uso de esta información.

## 2021-12-19 NOTE — ROUTINE PROCESS
notified of repeat SVE results:  1.5 external os, closed internal os/thick and -2. Discharge order received. Pt to f/u at next scheduled OB appt. Discharge instructions given along with PTL precautions. Pt verbalizes understanding and given opportunity to ask questions. Copy of discharge instructions provided.

## 2021-12-19 NOTE — PROGRESS NOTES
1230- Patient presents to labor and delivery  26weeks 4days for complaints of intermittant abdominal and lower back pain that has been ongoing for the last three days, patient states pain was more severe yesterday, patient took tylenol for the pain yesterday, currently rates pain 8/10, denies vaginal bleeding but states she has had a large amount of mucous discharge, patient states she last had sex yesterday, last ate about an hour ago, chart is flagged upon opening for bacterial infection, patient states this was positive in May or  and she completed the antibiotics. 1326-Spoke with Dr. Zina Zaman, made aware of patient, orders given      [id number]- Bedside and Verbal shift change report given to Christen Ascencio  (oncoming nurse) by IMELDA Dugan RN  (offgoing nurse). Report included the following information SBAR, Kardex and MAR.

## 2021-12-20 ENCOUNTER — HOSPITAL ENCOUNTER (INPATIENT)
Age: 36
LOS: 1 days | Discharge: ANOTHER HOSPITAL | DRG: 563 | End: 2021-12-20
Attending: OBSTETRICS & GYNECOLOGY | Admitting: OBSTETRICS & GYNECOLOGY
Payer: MEDICAID

## 2021-12-20 VITALS
WEIGHT: 220 LBS | BODY MASS INDEX: 38.98 KG/M2 | RESPIRATION RATE: 20 BRPM | DIASTOLIC BLOOD PRESSURE: 66 MMHG | HEIGHT: 63 IN | OXYGEN SATURATION: 99 % | SYSTOLIC BLOOD PRESSURE: 89 MMHG | HEART RATE: 108 BPM | TEMPERATURE: 98.9 F

## 2021-12-20 LAB
ABO + RH BLD: NORMAL
ANION GAP SERPL CALC-SCNC: 7 MMOL/L (ref 3–18)
BASOPHILS # BLD: 0 K/UL (ref 0–0.1)
BASOPHILS NFR BLD: 0 % (ref 0–2)
BLOOD GROUP ANTIBODIES SERPL: NORMAL
BUN SERPL-MCNC: 5 MG/DL (ref 7–18)
BUN/CREAT SERPL: 8 (ref 12–20)
CALCIUM SERPL-MCNC: 8.9 MG/DL (ref 8.5–10.1)
CHLORIDE SERPL-SCNC: 105 MMOL/L (ref 100–111)
CO2 SERPL-SCNC: 26 MMOL/L (ref 21–32)
CREAT SERPL-MCNC: 0.59 MG/DL (ref 0.6–1.3)
DIFFERENTIAL METHOD BLD: ABNORMAL
EOSINOPHIL # BLD: 0 K/UL (ref 0–0.4)
EOSINOPHIL NFR BLD: 0 % (ref 0–5)
ERYTHROCYTE [DISTWIDTH] IN BLOOD BY AUTOMATED COUNT: 15 % (ref 11.6–14.5)
GLUCOSE SERPL-MCNC: 58 MG/DL (ref 74–99)
HCT VFR BLD AUTO: 35.6 % (ref 35–45)
HGB BLD-MCNC: 11.3 G/DL (ref 12–16)
IMM GRANULOCYTES # BLD AUTO: 0.1 K/UL (ref 0–0.04)
IMM GRANULOCYTES NFR BLD AUTO: 1 % (ref 0–0.5)
LYMPHOCYTES # BLD: 1.5 K/UL (ref 0.9–3.6)
LYMPHOCYTES NFR BLD: 11 % (ref 21–52)
MAGNESIUM SERPL-MCNC: 2.1 MG/DL (ref 1.6–2.6)
MCH RBC QN AUTO: 29.7 PG (ref 24–34)
MCHC RBC AUTO-ENTMCNC: 31.7 G/DL (ref 31–37)
MCV RBC AUTO: 93.7 FL (ref 78–100)
MONOCYTES # BLD: 0.9 K/UL (ref 0.05–1.2)
MONOCYTES NFR BLD: 7 % (ref 3–10)
NEUTS SEG # BLD: 10.6 K/UL (ref 1.8–8)
NEUTS SEG NFR BLD: 81 % (ref 40–73)
NRBC # BLD: 0 K/UL (ref 0–0.01)
NRBC BLD-RTO: 0 PER 100 WBC
PLATELET # BLD AUTO: 239 K/UL (ref 135–420)
PMV BLD AUTO: 9.9 FL (ref 9.2–11.8)
POTASSIUM SERPL-SCNC: 3.7 MMOL/L (ref 3.5–5.5)
RBC # BLD AUTO: 3.8 M/UL (ref 4.2–5.3)
SODIUM SERPL-SCNC: 138 MMOL/L (ref 136–145)
SPECIMEN EXP DATE BLD: NORMAL
WBC # BLD AUTO: 13.1 K/UL (ref 4.6–13.2)

## 2021-12-20 PROCEDURE — 80048 BASIC METABOLIC PNL TOTAL CA: CPT

## 2021-12-20 PROCEDURE — 74011000258 HC RX REV CODE- 258: Performed by: OBSTETRICS & GYNECOLOGY

## 2021-12-20 PROCEDURE — 85025 COMPLETE CBC W/AUTO DIFF WBC: CPT

## 2021-12-20 PROCEDURE — 83735 ASSAY OF MAGNESIUM: CPT

## 2021-12-20 PROCEDURE — 86901 BLOOD TYPING SEROLOGIC RH(D): CPT

## 2021-12-20 PROCEDURE — 75410000002 HC LABOR FEE PER 1 HR

## 2021-12-20 PROCEDURE — 74011250636 HC RX REV CODE- 250/636: Performed by: OBSTETRICS & GYNECOLOGY

## 2021-12-20 PROCEDURE — 65270000032 HC RM SEMIPRIVATE

## 2021-12-20 PROCEDURE — 99284 EMERGENCY DEPT VISIT MOD MDM: CPT

## 2021-12-20 RX ORDER — LIDOCAINE HYDROCHLORIDE 10 MG/ML
20 INJECTION, SOLUTION EPIDURAL; INFILTRATION; INTRACAUDAL; PERINEURAL AS NEEDED
Status: DISCONTINUED | OUTPATIENT
Start: 2021-12-20 | End: 2021-12-20 | Stop reason: HOSPADM

## 2021-12-20 RX ORDER — PENICILLIN G POTASSIUM 5000000 [IU]/1
2.5 INJECTION, POWDER, FOR SOLUTION INTRAMUSCULAR; INTRAVENOUS EVERY 4 HOURS
Status: DISCONTINUED | OUTPATIENT
Start: 2021-12-20 | End: 2021-12-20 | Stop reason: HOSPADM

## 2021-12-20 RX ORDER — SODIUM CHLORIDE, SODIUM LACTATE, POTASSIUM CHLORIDE, CALCIUM CHLORIDE 600; 310; 30; 20 MG/100ML; MG/100ML; MG/100ML; MG/100ML
125 INJECTION, SOLUTION INTRAVENOUS CONTINUOUS
Status: DISCONTINUED | OUTPATIENT
Start: 2021-12-20 | End: 2021-12-20 | Stop reason: HOSPADM

## 2021-12-20 RX ORDER — MAGNESIUM SULFATE HEPTAHYDRATE 40 MG/ML
4 INJECTION, SOLUTION INTRAVENOUS ONCE
Status: COMPLETED | OUTPATIENT
Start: 2021-12-20 | End: 2021-12-20

## 2021-12-20 RX ORDER — BETAMETHASONE SODIUM PHOSPHATE AND BETAMETHASONE ACETATE 3; 3 MG/ML; MG/ML
12 INJECTION, SUSPENSION INTRA-ARTICULAR; INTRALESIONAL; INTRAMUSCULAR; SOFT TISSUE EVERY 24 HOURS
Status: DISCONTINUED | OUTPATIENT
Start: 2021-12-20 | End: 2021-12-20 | Stop reason: HOSPADM

## 2021-12-20 RX ORDER — TERBUTALINE SULFATE 1 MG/ML
0.25 INJECTION SUBCUTANEOUS
Status: DISCONTINUED | OUTPATIENT
Start: 2021-12-20 | End: 2021-12-20 | Stop reason: HOSPADM

## 2021-12-20 RX ORDER — MAGNESIUM SULFATE HEPTAHYDRATE 40 MG/ML
2 INJECTION, SOLUTION INTRAVENOUS CONTINUOUS
Status: DISCONTINUED | OUTPATIENT
Start: 2021-12-20 | End: 2021-12-20 | Stop reason: HOSPADM

## 2021-12-20 RX ORDER — METHYLERGONOVINE MALEATE 0.2 MG/ML
0.2 INJECTION INTRAVENOUS AS NEEDED
Status: DISCONTINUED | OUTPATIENT
Start: 2021-12-20 | End: 2021-12-20 | Stop reason: HOSPADM

## 2021-12-20 RX ORDER — SODIUM CHLORIDE 0.9 % (FLUSH) 0.9 %
5-40 SYRINGE (ML) INJECTION EVERY 8 HOURS
Status: DISCONTINUED | OUTPATIENT
Start: 2021-12-20 | End: 2021-12-20 | Stop reason: HOSPADM

## 2021-12-20 RX ORDER — NALBUPHINE HYDROCHLORIDE 10 MG/ML
10 INJECTION, SOLUTION INTRAMUSCULAR; INTRAVENOUS; SUBCUTANEOUS
Status: DISCONTINUED | OUTPATIENT
Start: 2021-12-20 | End: 2021-12-20 | Stop reason: HOSPADM

## 2021-12-20 RX ORDER — OXYTOCIN/0.9 % SODIUM CHLORIDE 30/500 ML
87.3 PLASTIC BAG, INJECTION (ML) INTRAVENOUS AS NEEDED
Status: DISCONTINUED | OUTPATIENT
Start: 2021-12-20 | End: 2021-12-20 | Stop reason: SDUPTHER

## 2021-12-20 RX ORDER — BUTORPHANOL TARTRATE 2 MG/ML
2 INJECTION INTRAMUSCULAR; INTRAVENOUS
Status: DISCONTINUED | OUTPATIENT
Start: 2021-12-20 | End: 2021-12-20 | Stop reason: HOSPADM

## 2021-12-20 RX ORDER — OXYTOCIN/0.9 % SODIUM CHLORIDE 30/500 ML
87.3 PLASTIC BAG, INJECTION (ML) INTRAVENOUS AS NEEDED
Status: DISCONTINUED | OUTPATIENT
Start: 2021-12-20 | End: 2021-12-20 | Stop reason: HOSPADM

## 2021-12-20 RX ORDER — SODIUM CHLORIDE 0.9 % (FLUSH) 0.9 %
5-40 SYRINGE (ML) INJECTION AS NEEDED
Status: DISCONTINUED | OUTPATIENT
Start: 2021-12-20 | End: 2021-12-20 | Stop reason: HOSPADM

## 2021-12-20 RX ORDER — OXYTOCIN/RINGER'S LACTATE 30/500 ML
10 PLASTIC BAG, INJECTION (ML) INTRAVENOUS AS NEEDED
Status: DISCONTINUED | OUTPATIENT
Start: 2021-12-20 | End: 2021-12-20 | Stop reason: HOSPADM

## 2021-12-20 RX ORDER — MAGNESIUM SULFATE HEPTAHYDRATE 40 MG/ML
2 INJECTION, SOLUTION INTRAVENOUS ONCE
Status: COMPLETED | OUTPATIENT
Start: 2021-12-20 | End: 2021-12-20

## 2021-12-20 RX ORDER — HYDROMORPHONE HYDROCHLORIDE 1 MG/ML
1 INJECTION, SOLUTION INTRAMUSCULAR; INTRAVENOUS; SUBCUTANEOUS
Status: DISCONTINUED | OUTPATIENT
Start: 2021-12-20 | End: 2021-12-20 | Stop reason: HOSPADM

## 2021-12-20 RX ORDER — MINERAL OIL
30 OIL (ML) ORAL AS NEEDED
Status: DISCONTINUED | OUTPATIENT
Start: 2021-12-20 | End: 2021-12-20 | Stop reason: HOSPADM

## 2021-12-20 RX ORDER — OXYTOCIN/RINGER'S LACTATE 30/500 ML
10 PLASTIC BAG, INJECTION (ML) INTRAVENOUS AS NEEDED
Status: DISCONTINUED | OUTPATIENT
Start: 2021-12-20 | End: 2021-12-20 | Stop reason: SDUPTHER

## 2021-12-20 RX ORDER — PENICILLIN G POTASSIUM 5000000 [IU]/1
5 INJECTION, POWDER, FOR SOLUTION INTRAMUSCULAR; INTRAVENOUS ONCE
Status: DISCONTINUED | OUTPATIENT
Start: 2021-12-20 | End: 2021-12-20 | Stop reason: CLARIF

## 2021-12-20 RX ADMIN — MAGNESIUM SULFATE IN WATER 2 G/HR: 40 INJECTION, SOLUTION INTRAVENOUS at 15:46

## 2021-12-20 RX ADMIN — MAGNESIUM SULFATE HEPTAHYDRATE 2 G: 40 INJECTION, SOLUTION INTRAVENOUS at 15:23

## 2021-12-20 RX ADMIN — SODIUM CHLORIDE, POTASSIUM CHLORIDE, SODIUM LACTATE AND CALCIUM CHLORIDE 125 ML/HR: 600; 310; 30; 20 INJECTION, SOLUTION INTRAVENOUS at 15:00

## 2021-12-20 RX ADMIN — MAGNESIUM SULFATE HEPTAHYDRATE 4 G: 40 INJECTION, SOLUTION INTRAVENOUS at 14:59

## 2021-12-20 RX ADMIN — SODIUM CHLORIDE 5 MILLION UNITS: 900 INJECTION INTRAVENOUS at 16:00

## 2021-12-20 RX ADMIN — BETAMETHASONE SODIUM PHOSPHATE AND BETAMETHASONE ACETATE 12 MG: 3; 3 INJECTION, SUSPENSION INTRA-ARTICULAR; INTRALESIONAL; INTRAMUSCULAR at 14:44

## 2021-12-20 NOTE — PROGRESS NOTES
Pt arrived to LD triage with complaint of abdominal pain, ctx and spotting. Patient is 26.5 EGA . Pt of [facility] (Dr. Cornel Jose on Service call). Pt denies Leakage of fluid, HA, blurred vision, or epigastric pain. Pt confirms fetal movement. Patient rates her pain 6/10. PT triage assessment completed. FHR elevated on tracing with baseline of 165. Maternal pulse elevated 110-115 bpm. Dr. Cornel Jose on unit and in to assess once triage assessment completed with assistance of [place] interpreting service. See Dr. Quiñonez Cool notes for full MD assessment details. SVE by MD . Fetal position confirmed head down. Pt admitted. PIV started,LR bolus, Magnesium, Penicillin, and betamethasone given as ordered. 1600-SVE recheck by Dr. Cornel Jose no change from previous SVE. Transfer to [facility] arranged by MD. See doc flow and discharge notes/reports. FHR tracing charted in [employer] hortensia. Please review [employer] tracing for details. - TRANSFER - OUT REPORT:    Verbal report given to IMELDA Stoner RN(name) on Marlo Florez  being transferred to  at [facility]) for urgent transfer       Report consisted of patients Situation, Background, Assessment and   Recommendations(SBAR). Information from the following report(s) SBAR was reviewed with the receiving nurse. Lines:   Peripheral IV 21 Anterior;Right Forearm (Active)        Opportunity for questions and clarification was provided.       Patient transported with:  Isabela Sanchez

## 2021-12-20 NOTE — PROGRESS NOTES
Labor Progress Note  Patient seen, fetal heart rate and contraction pattern evaluated, patient examined. 113/67    Physical Exam:  Cervical Exam:  7 cm dilated with BBOW, unable to palpate presenting part  Membranes:  Intact  Uterine Activity: irregular (at times 3min apart)  Fetal Heart Rate: 140s, mod tavon, +accels, occasional variable decels    Assessment/Plan:  35yo [id number] at 26w5d in  labor  1.  labor - no cervical change over >2hrs. Currently on mag sulfate, penicillin, s/p 1st dose of betamethasone. Called and discussed with MFM at [facility], but no rooms currently available so recommended calling [place]. Discussed with Dr. Dee Charles at [facility] who agreed to accept transfer. Patient notified and transport contacted.   2. Awaiting prenatal records from [place]

## 2021-12-20 NOTE — H&P
History & Physical    Name: Warren Phoenix MRN: [id number]  SSN: [id number]    YOB: 1985  Age: 39 y.o. Sex: female        Subjective:     Estimated Date of Delivery: 3/23/22  OB History    Para Term  AB Living   7 3     3 3   SAB IAB Ectopic Molar Multiple Live Births   3         3      # Outcome Date GA Lbr Denis/2nd Weight Sex Delivery Anes PTL Lv   7 Current            6 SAB            5 SAB            4 SAB            3 Para      Vag-Spont   KELLE   2 Para      Vag-Spont   KELLE   1 Para      Vag-Spont   KELLE       Ms. Mohamud Albarado is admitted with pregnancy at 26w5d for  labor. Patient presented to L&D yesterday afternoon with complaints of low back pain and abdominal pain. Her pain completely resolved with IV fluids and Tylenol. Her cervical exam on discharge was external os 1.5-2cm with closed internal os. She reports that this morning around 1:00AM, she started to have painful contractions and worsening low back pain. She has also been having spotting. Denies fever, nausea, vomiting, or other symptoms. Prenatal course was in [place] and normal per patient report. No prenatal records available. OB Hx - [id number], term SVDx3    Past Medical History:   Diagnosis Date    Miscarriage      No past surgical history on file. Social History     Occupational History    Not on file   Tobacco Use    Smoking status: Never Smoker    Smokeless tobacco: Never Used   Substance and Sexual Activity    Alcohol use: Not Currently    Drug use: Never    Sexual activity: Yes     Partners: Male     No family history on file. No Known Allergies  Prior to Admission medications    Medication Sig Start Date End Date Taking? Authorizing Provider   PNV Comb #2-Iron-FA-Omega 3 29-1-400 mg cmpk Take 1 Tablet by mouth daily. Yes Provider, Historical   acetaminophen (TYLENOL) 325 mg tablet Take 325 mg by mouth every four (4) hours as needed for Pain.  Indications: pain   Yes Provider, Historical        Review of Systems: Per HPI    Objective:     Vitals:  Vitals:    21 1241 21 1250 21 1310   BP: (!) (P) 79/47  (!) (P) 95/45   Pulse: (!) (P) 110  (!) (P) 111   Resp: (P) 20  (P) 20   Temp: (P) 99.5 °F (37.5 °C)  (P) 98 °F (36.7 °C)   SpO2: (P) 100%  (P) 99%   Weight:  99.8 kg (220 lb)    Height:  5' 2.99\" (1.6 m)         Physical Exam:  Patient without distress. Abdomen: soft, nontender  Fundus: soft and non tender  Perineum: blood present, amniotic fluid absent  Cervical Exam: 7 cm dilated with BBOW  Lower Extremities:  - Edema No  Membranes:  Intact  Fetal Heart Rate: Baseline: 150s per minute  Variability: moderate  [place]ma: difficulty tracing contractions - every 8-10min per patient    BSUS: cephalic IUP, BPD 08C7Z, posterior placenta,amniotic fluid SDP 4cm    Prenatal Labs:   Lab Results   Component Value Date/Time    ABO/Rh(D) A POSITIVE 2021 10:04 AM         Assessment/Plan:     Active Problems:    Pregnancy (2021)      labor    Plan:   1.  labor - Admit for  labor. Start Mag sulfate, penicillin, BMZ.  2. Peds notified. Patient aware that infant will be transferred to [facility]. 3. Request send for prenatal records.   4. Anticipate

## 2021-12-20 NOTE — DISCHARGE SUMMARY
Obstetrical Discharge Summary     Name: Warren Phoenix MRN: [id number]  SSN: [id number]    YOB: 1985  Age: 39 y.o. Sex: female      Allergies: Patient has no known allergies. Admit Date: 2021    Discharge Date: 2021     Admitting Physician: Katie Mckeon MD     Attending Physician:  Robert Elizabeth MD     * Admission Diagnoses: Pregnancy [Z34.90]  Premature infant of 27 weeks gestation [P07.26]    * Discharge Diagnoses: This patient has no babies on file. Additional Diagnoses:   Hospital Problems as of 2021 Never Reviewed          Codes Class Noted - Resolved POA    Premature infant of 27 weeks gestation ICD-10-CM: P07.26  ICD-9-CM: 765.24  2021 - Present Unknown        Pregnancy ICD-10-CM: Z34.90  ICD-9-CM: V22.2  2021 - Present Unknown             Lab Results   Component Value Date/Time    ABO/Rh(D) A POSITIVE 2021 02:50 PM      There is no immunization history on file for this patient. * Procedures: none  * No surgery found *           * Discharge Condition: stable    [facility] Course: This patient is a  who presented at 26w5d in  labor. She was 7cm dilated with BBOW on admission. She was started on Mag sulfate, penicillin, and received her first dose of BMZ. She remained stable and unchanged for multiple hours, so [facility] was contacted for transfer for higher level nursery. When transport arrived, her cervix was checked and again unchanged. Fetal heart rate was in the 140s and reactive for gestational age. No contractions were noted on toco for over 1hr, but pt reported that she could feel them every 10 minutes. We explained the risks of transfer and patient agreed with this plan. * Disposition: Transferred to [facility]    Discharge Medications:   Current Discharge Medication List      Continue Mag sulfate and IV fluids on transport.       Follow-up Information    None

## 2021-12-21 LAB
BACTERIA SPEC CULT: ABNORMAL
CC UR VC: ABNORMAL
SERVICE CMNT-IMP: ABNORMAL

## 2022-01-07 LAB
SERVICE CMNT-IMP: NORMAL
WET PREP GENITAL: NORMAL